# Patient Record
Sex: FEMALE | URBAN - METROPOLITAN AREA
[De-identification: names, ages, dates, MRNs, and addresses within clinical notes are randomized per-mention and may not be internally consistent; named-entity substitution may affect disease eponyms.]

---

## 2017-04-10 ENCOUNTER — INPATIENT (INPATIENT)
Facility: HOSPITAL | Age: 82
LOS: 4 days | DRG: 682 | End: 2017-04-15
Attending: FAMILY MEDICINE | Admitting: HOSPITALIST
Payer: COMMERCIAL

## 2017-04-10 VITALS
SYSTOLIC BLOOD PRESSURE: 109 MMHG | DIASTOLIC BLOOD PRESSURE: 68 MMHG | HEART RATE: 88 BPM | RESPIRATION RATE: 14 BRPM | TEMPERATURE: 99 F | OXYGEN SATURATION: 96 %

## 2017-04-10 DIAGNOSIS — M10.9 GOUT, UNSPECIFIED: ICD-10-CM

## 2017-04-10 DIAGNOSIS — N28.9 DISORDER OF KIDNEY AND URETER, UNSPECIFIED: ICD-10-CM

## 2017-04-10 DIAGNOSIS — E87.1 HYPO-OSMOLALITY AND HYPONATREMIA: ICD-10-CM

## 2017-04-10 DIAGNOSIS — E86.0 DEHYDRATION: ICD-10-CM

## 2017-04-10 DIAGNOSIS — K21.9 GASTRO-ESOPHAGEAL REFLUX DISEASE WITHOUT ESOPHAGITIS: ICD-10-CM

## 2017-04-10 DIAGNOSIS — E78.5 HYPERLIPIDEMIA, UNSPECIFIED: ICD-10-CM

## 2017-04-10 DIAGNOSIS — K65.4 SCLEROSING MESENTERITIS: ICD-10-CM

## 2017-04-10 DIAGNOSIS — R13.10 DYSPHAGIA, UNSPECIFIED: ICD-10-CM

## 2017-04-10 DIAGNOSIS — R50.9 FEVER, UNSPECIFIED: ICD-10-CM

## 2017-04-10 DIAGNOSIS — I82.90 ACUTE EMBOLISM AND THROMBOSIS OF UNSPECIFIED VEIN: ICD-10-CM

## 2017-04-10 DIAGNOSIS — J98.11 ATELECTASIS: ICD-10-CM

## 2017-04-10 DIAGNOSIS — J44.9 CHRONIC OBSTRUCTIVE PULMONARY DISEASE, UNSPECIFIED: ICD-10-CM

## 2017-04-10 DIAGNOSIS — I10 ESSENTIAL (PRIMARY) HYPERTENSION: ICD-10-CM

## 2017-04-10 DIAGNOSIS — N17.9 ACUTE KIDNEY FAILURE, UNSPECIFIED: ICD-10-CM

## 2017-04-10 LAB
ALBUMIN SERPL ELPH-MCNC: 1 G/DL — LOW (ref 3.3–5)
ALP SERPL-CCNC: 71 U/L — SIGNIFICANT CHANGE UP (ref 40–120)
ALT FLD-CCNC: 19 U/L — SIGNIFICANT CHANGE UP (ref 12–78)
AMYLASE P1 CFR SERPL: 17 U/L — LOW (ref 25–115)
ANION GAP SERPL CALC-SCNC: 12 MMOL/L — SIGNIFICANT CHANGE UP (ref 5–17)
APPEARANCE UR: CLEAR — SIGNIFICANT CHANGE UP
AST SERPL-CCNC: 37 U/L — SIGNIFICANT CHANGE UP (ref 15–37)
BASOPHILS NFR BLD AUTO: 3 % — HIGH (ref 0–2)
BILIRUB SERPL-MCNC: 0.5 MG/DL — SIGNIFICANT CHANGE UP (ref 0.2–1.2)
BILIRUB UR-MCNC: ABNORMAL
BUN SERPL-MCNC: 47 MG/DL — HIGH (ref 7–23)
CALCIUM SERPL-MCNC: 8.1 MG/DL — LOW (ref 8.5–10.1)
CHLORIDE SERPL-SCNC: 107 MMOL/L — SIGNIFICANT CHANGE UP (ref 96–108)
CO2 SERPL-SCNC: 12 MMOL/L — LOW (ref 22–31)
COLOR SPEC: YELLOW — SIGNIFICANT CHANGE UP
CREAT SERPL-MCNC: 3.1 MG/DL — HIGH (ref 0.5–1.3)
DIFF PNL FLD: ABNORMAL
GLUCOSE SERPL-MCNC: 62 MG/DL — LOW (ref 70–99)
GLUCOSE UR QL: NEGATIVE — SIGNIFICANT CHANGE UP
HCT VFR BLD CALC: 40.3 % — SIGNIFICANT CHANGE UP (ref 34.5–45)
HGB BLD-MCNC: 12.8 G/DL — SIGNIFICANT CHANGE UP (ref 11.5–15.5)
KETONES UR-MCNC: ABNORMAL
LACTATE SERPL-SCNC: 1.5 MMOL/L — SIGNIFICANT CHANGE UP (ref 0.7–2)
LACTATE SERPL-SCNC: 2.2 MMOL/L — HIGH (ref 0.7–2)
LEUKOCYTE ESTERASE UR-ACNC: ABNORMAL
LIDOCAIN IGE QN: 72 U/L — LOW (ref 73–393)
LYMPHOCYTES # BLD AUTO: 14 % — SIGNIFICANT CHANGE UP (ref 13–44)
MCHC RBC-ENTMCNC: 30.1 PG — SIGNIFICANT CHANGE UP (ref 27–34)
MCHC RBC-ENTMCNC: 31.7 GM/DL — LOW (ref 32–36)
MCV RBC AUTO: 94.9 FL — SIGNIFICANT CHANGE UP (ref 80–100)
MONOCYTES NFR BLD AUTO: 6 % — SIGNIFICANT CHANGE UP (ref 1–9)
NEUTROPHILS NFR BLD AUTO: 70 % — SIGNIFICANT CHANGE UP (ref 43–77)
NITRITE UR-MCNC: NEGATIVE — SIGNIFICANT CHANGE UP
PH UR: 5 — SIGNIFICANT CHANGE UP (ref 4.8–8)
PLATELET # BLD AUTO: 116 K/UL — LOW (ref 150–400)
POTASSIUM SERPL-MCNC: 4.8 MMOL/L — SIGNIFICANT CHANGE UP (ref 3.5–5.3)
POTASSIUM SERPL-SCNC: 4.8 MMOL/L — SIGNIFICANT CHANGE UP (ref 3.5–5.3)
PROT SERPL-MCNC: 8.2 G/DL — SIGNIFICANT CHANGE UP (ref 6–8.3)
PROT UR-MCNC: 75 MG/DL
RBC # BLD: 4.25 M/UL — SIGNIFICANT CHANGE UP (ref 3.8–5.2)
RBC # FLD: 13.7 % — SIGNIFICANT CHANGE UP (ref 10.3–14.5)
SODIUM SERPL-SCNC: 131 MMOL/L — LOW (ref 135–145)
SP GR SPEC: 1.02 — SIGNIFICANT CHANGE UP (ref 1.01–1.02)
UROBILINOGEN FLD QL: 1
WBC # BLD: 7.1 K/UL — SIGNIFICANT CHANGE UP (ref 3.8–10.5)
WBC # FLD AUTO: 7.1 K/UL — SIGNIFICANT CHANGE UP (ref 3.8–10.5)

## 2017-04-10 PROCEDURE — 76775 US EXAM ABDO BACK WALL LIM: CPT | Mod: 26

## 2017-04-10 PROCEDURE — 73502 X-RAY EXAM HIP UNI 2-3 VIEWS: CPT | Mod: 26,RT

## 2017-04-10 PROCEDURE — 74176 CT ABD & PELVIS W/O CONTRAST: CPT | Mod: 26

## 2017-04-10 PROCEDURE — 93010 ELECTROCARDIOGRAM REPORT: CPT

## 2017-04-10 PROCEDURE — 99223 1ST HOSP IP/OBS HIGH 75: CPT | Mod: AI,GC

## 2017-04-10 PROCEDURE — 71010: CPT | Mod: 26

## 2017-04-10 PROCEDURE — 99285 EMERGENCY DEPT VISIT HI MDM: CPT

## 2017-04-10 RX ORDER — DOCUSATE SODIUM 100 MG
100 CAPSULE ORAL
Refills: 0 | Status: DISCONTINUED | OUTPATIENT
Start: 2017-04-10 | End: 2017-04-15

## 2017-04-10 RX ORDER — SODIUM CHLORIDE 9 MG/ML
1000 INJECTION INTRAMUSCULAR; INTRAVENOUS; SUBCUTANEOUS
Refills: 0 | Status: DISCONTINUED | OUTPATIENT
Start: 2017-04-10 | End: 2017-04-12

## 2017-04-10 RX ORDER — SODIUM CHLORIDE 0.65 %
1 AEROSOL, SPRAY (ML) NASAL ONCE
Refills: 0 | Status: COMPLETED | OUTPATIENT
Start: 2017-04-10 | End: 2017-04-15

## 2017-04-10 RX ORDER — IPRATROPIUM/ALBUTEROL SULFATE 18-103MCG
3 AEROSOL WITH ADAPTER (GRAM) INHALATION EVERY 6 HOURS
Refills: 0 | Status: DISCONTINUED | OUTPATIENT
Start: 2017-04-10 | End: 2017-04-15

## 2017-04-10 RX ORDER — SENNA PLUS 8.6 MG/1
2 TABLET ORAL AT BEDTIME
Refills: 0 | Status: DISCONTINUED | OUTPATIENT
Start: 2017-04-10 | End: 2017-04-15

## 2017-04-10 RX ORDER — PANTOPRAZOLE SODIUM 20 MG/1
40 TABLET, DELAYED RELEASE ORAL ONCE
Refills: 0 | Status: COMPLETED | OUTPATIENT
Start: 2017-04-10 | End: 2017-04-10

## 2017-04-10 RX ORDER — HEPARIN SODIUM 5000 [USP'U]/ML
5000 INJECTION INTRAVENOUS; SUBCUTANEOUS EVERY 12 HOURS
Refills: 0 | Status: DISCONTINUED | OUTPATIENT
Start: 2017-04-10 | End: 2017-04-15

## 2017-04-10 RX ORDER — ALLOPURINOL 300 MG
100 TABLET ORAL DAILY
Refills: 0 | Status: DISCONTINUED | OUTPATIENT
Start: 2017-04-10 | End: 2017-04-15

## 2017-04-10 RX ORDER — SODIUM CHLORIDE 9 MG/ML
1000 INJECTION, SOLUTION INTRAVENOUS
Refills: 0 | Status: COMPLETED | OUTPATIENT
Start: 2017-04-10 | End: 2017-04-10

## 2017-04-10 RX ORDER — ALLOPURINOL 300 MG
2.5 TABLET ORAL DAILY
Refills: 0 | Status: DISCONTINUED | OUTPATIENT
Start: 2017-04-10 | End: 2017-04-10

## 2017-04-10 RX ORDER — SODIUM CHLORIDE 9 MG/ML
1000 INJECTION INTRAMUSCULAR; INTRAVENOUS; SUBCUTANEOUS ONCE
Refills: 0 | Status: COMPLETED | OUTPATIENT
Start: 2017-04-10 | End: 2017-04-10

## 2017-04-10 RX ORDER — AMLODIPINE BESYLATE 2.5 MG/1
5 TABLET ORAL DAILY
Refills: 0 | Status: DISCONTINUED | OUTPATIENT
Start: 2017-04-10 | End: 2017-04-13

## 2017-04-10 RX ORDER — ATORVASTATIN CALCIUM 80 MG/1
10 TABLET, FILM COATED ORAL AT BEDTIME
Refills: 0 | Status: DISCONTINUED | OUTPATIENT
Start: 2017-04-10 | End: 2017-04-15

## 2017-04-10 RX ORDER — SODIUM CHLORIDE 9 MG/ML
3 INJECTION INTRAMUSCULAR; INTRAVENOUS; SUBCUTANEOUS ONCE
Refills: 0 | Status: COMPLETED | OUTPATIENT
Start: 2017-04-10 | End: 2017-04-10

## 2017-04-10 RX ORDER — PANTOPRAZOLE SODIUM 20 MG/1
40 TABLET, DELAYED RELEASE ORAL
Refills: 0 | Status: DISCONTINUED | OUTPATIENT
Start: 2017-04-10 | End: 2017-04-15

## 2017-04-10 RX ADMIN — SODIUM CHLORIDE 1000 MILLILITER(S): 9 INJECTION INTRAMUSCULAR; INTRAVENOUS; SUBCUTANEOUS at 10:45

## 2017-04-10 RX ADMIN — ATORVASTATIN CALCIUM 10 MILLIGRAM(S): 80 TABLET, FILM COATED ORAL at 22:04

## 2017-04-10 RX ADMIN — SODIUM CHLORIDE 70 MILLILITER(S): 9 INJECTION INTRAMUSCULAR; INTRAVENOUS; SUBCUTANEOUS at 13:08

## 2017-04-10 RX ADMIN — SODIUM CHLORIDE 1000 MILLILITER(S): 9 INJECTION INTRAMUSCULAR; INTRAVENOUS; SUBCUTANEOUS at 08:22

## 2017-04-10 RX ADMIN — SENNA PLUS 2 TABLET(S): 8.6 TABLET ORAL at 19:39

## 2017-04-10 RX ADMIN — SODIUM CHLORIDE 3 MILLILITER(S): 9 INJECTION INTRAMUSCULAR; INTRAVENOUS; SUBCUTANEOUS at 08:21

## 2017-04-10 RX ADMIN — SODIUM CHLORIDE 75 MILLILITER(S): 9 INJECTION, SOLUTION INTRAVENOUS at 13:11

## 2017-04-10 RX ADMIN — PANTOPRAZOLE SODIUM 40 MILLIGRAM(S): 20 TABLET, DELAYED RELEASE ORAL at 08:36

## 2017-04-10 RX ADMIN — Medication 100 MILLIGRAM(S): at 19:39

## 2017-04-10 NOTE — PATIENT PROFILE ADULT. - PMH
Acute kidney injury    COPD (chronic obstructive pulmonary disease)    Dysphagia    GERD (gastroesophageal reflux disease)    HTN (hypertension)    Hyperlipidemia

## 2017-04-10 NOTE — H&P ADULT - PROBLEM SELECTOR PLAN 1
likely 2/2 dehydration; symptomatically improved with IVF  1. will cont ivf 70cc/hr  2. hyponatremia likely 2/2 dehydration, will monitor bmp likely 2/2 dehydration; symptomatically improved with IVF  1. will cont ivf 70cc/hr  2. hyponatremia likely 2/2 dehydration, will monitor bmp  3. consulting dr pope for nephrology consult

## 2017-04-10 NOTE — H&P ADULT - NSHPLABSRESULTS_GEN_ALL_CORE
BMP: Na =131, BUN/Cr = 47/3.1.    CT Renal Stone Hunt: Impression:  No hydroureteronephrosis or urolithiasis.  Bilateral indeterminate renal cortical lesions as described. Correlate   with MR  Nonspecific mesenteric panniculitis.  Bibasilar pulmonary parenchymal nodules. Mild mediastinal adenopathy.   Correlate with chest CT.    Xray Chest view AP: IMPRESSION:   Bilateral lower lobe atelectasis or infiltrate.    Xray Hip w/ Pelvis: IMPRESSION:   Severe right hip osteoarthritis. BMP: Na =131, BUN/Cr = 47/3.1.    CT Renal Stone Hunt: Impression:  No hydroureteronephrosis or urolithiasis.  Bilateral indeterminate renal cortical lesions as described. Correlate   with MR  Nonspecific mesenteric panniculitis.  Bibasilar pulmonary parenchymal nodules. Mild mediastinal adenopathy.   Correlate with chest CT.    Xray Chest view AP: IMPRESSION:   Bilateral lower lobe atelectasis or infiltrate.    Xray Hip w/ Pelvis: IMPRESSION:   Severe right hip osteoarthritis.    EKG: PAC, left axis deviation, inferior infarct age undetermined, @76bpm.

## 2017-04-10 NOTE — H&P ADULT - PMH
Acute kidney injury    COPD (chronic obstructive pulmonary disease)    Dysphagia    GERD (gastroesophageal reflux disease)    HTN (hypertension)    Hyperlipidemia Acute kidney injury    COPD (chronic obstructive pulmonary disease)    Dysphagia    GERD (gastroesophageal reflux disease)    Gout    HTN (hypertension)    Hyperlipidemia

## 2017-04-10 NOTE — H&P ADULT - PROBLEM SELECTOR PLAN 8
will start on heparin for dvt ppx controlled  1. will continue amlodopine. will hold furosemide and valsartan for now

## 2017-04-10 NOTE — H&P ADULT - PROBLEM SELECTOR PLAN 4
chronic, spo2 = 98  1. will start O2 2LPM  2. Albuterol neb prn febrile in ER  1. f/u cbc and cultures

## 2017-04-10 NOTE — H&P ADULT - PROBLEM SELECTOR PLAN 7
controlled  1. will continue amlodopine. will hold furosemide and valsartan for now asymptomatic  1. will continue omeprazole

## 2017-04-10 NOTE — ED PROVIDER NOTE - MEDICAL DECISION MAKING DETAILS
cbc, cmp, amylase, lipase, lactate, UA, urine culture, EKG, chest x-ray, saline lock, IVF, right hip x-ray

## 2017-04-10 NOTE — ED PROVIDER NOTE - CARE PLAN
Principal Discharge DX:	Renal insufficiency  Instructions for follow-up, activity and diet:	admit  Secondary Diagnosis:	Dehydration  Secondary Diagnosis:	Weakness

## 2017-04-10 NOTE — H&P ADULT - ASSESSMENT
89F pmhx hld, gerd, htn, recently discharge with acute kidney injury r/o renal malignancy. 89F pmhx hld, gerd, htn, recent kidney injury admitted for acute kidney failure r/o renal malignancy. 89F pmhx hld, gerd, htn, recent kidney injury admitted for acute kidney failure secondary to dehydration , renal lesions 89F pmhx hld, gerd, and htn admitted for acute kidney failure secondary 2/2 dehydration, r/o malignancy from renal lesions

## 2017-04-10 NOTE — CHART NOTE - NSCHARTNOTEFT_GEN_A_CORE
PGY1 Service Note    Was called by RN because patient's family was present at bedside with questions, and because patient was constipated. Patient's family reports she has had very little urine output all day, and no BM in 2 days.     Vital Signs Last 24 Hrs  T(C): 36.7, Max: 36.9 (04-10 @ 17:21)  T(F): 98.1, Max: 98.5 (04-10 @ 17:21)  HR: 80 (67 - 80)  BP: 113/71 (113/71 - 138/62)  BP(mean): --  RR: 20 (15 - 20)  SpO2: 96% (96% - 98%)  Wt(kg): --    General: Well developed, well nourished, NAD  HEENT: NCAT, PERRLA, EOMI bl, moist mucous membranes   Neck: Supple, nontender, no mass  Neurology: A&Ox3, nonfocal, CN II-XII grossly intact, sensation intact, no gait abnormalities   Respiratory: CTA B/L, No W/R/R  CV: RRR, +S1/S2, no murmurs, rubs or gallops  Abdominal: Soft, NT, ND +BSx4  Extremities: No C/C/E, + peripheral pulses  MSK: Normal ROM, no joint erythema or warmth, no joint swelling   Skin: warm, dry, normal color, no rash or abnormal lesions    Plan: Gave senna and colace, patient had BM and passed little urine in commode. Asked nurse to straight cath patient to help with urine output. Also patient was reporting mild nasal congestion, ordered ocean nasal spray and asked RN to give humidified air via nasal cannula. Patient much more comfortable after BM passed. Will reassess as needed. Plan discussed with senior. PGY1 Service Note    Was called by RN because patient's family was present at bedside with questions, and because patient was constipated. Patient's family reports she has had very little urine output all day, and no BM in 2 days.     Vital Signs Last 24 Hrs  T(C): 36.7, Max: 36.9 (04-10 @ 17:21)  T(F): 98.1, Max: 98.5 (04-10 @ 17:21)  HR: 80 (67 - 80)  BP: 113/71 (113/71 - 138/62)  BP(mean): --  RR: 20 (15 - 20)  SpO2: 96% (96% - 98%)  Wt(kg): --    General: Well developed, NAD  HEENT: NCAT, PERRLA, EOMI bl, moist mucous membranes   Neck: Supple  Neurology: A&Ox3, CN II-XII grossly intact.   Respiratory: CTA B/L  CV: RRR, +S1/S2, holosystolic murmur  Abdominal: Soft, NT, mildly distended, +BS in RLQ  Extremities: No C/C/E, + peripheral pulses      Plan: Gave senna and colace, patient had BM and passed little urine in commode. Asked nurse to straight cath patient to help with urine output. Also patient was reporting mild nasal congestion, ordered ocean nasal spray and asked RN to give humidified air via nasal cannula. Patient much more comfortable after BM passed. Will reassess as needed. Plan discussed with senior. PGY1 Service Note    Was called by RN because patient's family was present at bedside with questions, and because patient was constipated. Patient's family reports she has had very little urine output all day, and no BM in 2 days.     Vital Signs Last 24 Hrs  T(C): 36.7, Max: 36.9 (04-10 @ 17:21)  T(F): 98.1, Max: 98.5 (04-10 @ 17:21)  HR: 80 (67 - 80)  BP: 113/71 (113/71 - 138/62)  BP(mean): --  RR: 20 (15 - 20)  SpO2: 96% (96% - 98%)  Wt(kg): --    General: Well developed, NAD  HEENT: NCAT, PERRLA, EOMI bl, moist mucous membranes   Neck: Supple  Neurology: A&Ox3, CN II-XII grossly intact.   Respiratory: CTA B/L  CV: RRR, +S1/S2, holosystolic murmur  Abdominal: Soft, NT, mildly distended, +BS in RLQ  Extremities: No C/C/E, + peripheral pulses      Plan: Gave senna and colace, patient had BM and passed little urine in commode. Asked nurse to straight cath patient to help with urine output. Also patient was reporting mild nasal congestion, ordered ocean nasal spray and asked RN to give humidified air via nasal cannula. Patient much more comfortable after BM passed. Will reassess as needed. Plan discussed with senior resident.    21:51, Addendum: Pt put out 250 cc's of yellow urine via straight catheter.

## 2017-04-10 NOTE — GOALS OF CARE CONVERSATION - PERSONAL ADVANCE DIRECTIVE - CONVERSATION DETAILS
spoke to pts daughter  regarding pts condition .She states that she left [facility] 2 days ago where she was told she may have lymphoma. she states that pt is deteriorating and hardly eating. she requests  home hospice evaluation.

## 2017-04-10 NOTE — ED ADULT NURSE NOTE - OBJECTIVE STATEMENT
pts daughter states pt has stopped walking in the past few days and has been unable to eat or drink, pt states "she feels a lump ini her throat", pt was at [facility] a few days ago. pt daughter also states she is weak

## 2017-04-10 NOTE — ED PROVIDER NOTE - OBJECTIVE STATEMENT
Pt is a 89 yo female who presents to the ED with a cc of weakness.  Family reports that on Thursday pt experienced a 24 hour period of nausea and diarrhea.  These symptoms improved but then on Friday pt began to complain of a burning pain in her throat and feels like it is swollen.  She has not wanted to eat of drink since.  Family reports that the very little they get her to take passes without difficulty.  Pt daughter took her to [facility] several days ago where they did some blood work and a CT of her neck which relieved cervical lymph nodes bilaterally some greater then 1 cm suspicious for lymphoma vs inflammatory.  Pt has no history of lymphoma.  She was then discharged from the ED.  Daughter reports that she still is refusing to eat or drink and approx 4 days ago stop ambulating.  She used to get around the house with some help and the aid of a walker.  Pt lives at home with her daughter.  Daughter also reports that it seems like the pt is having anxiety attacks and it is concerning her.  Pt refusing to try to ambulate secondary to diffuse pain and weakness.  She is on chronic oxygen at 2 liters NC.

## 2017-04-10 NOTE — H&P ADULT - PROBLEM SELECTOR PLAN 2
unknown, will r/o malignancy  1. will consult hematology for plan  2. will consult palliative care for advance directives questionable if lymphoma as per [facility] assessment, will r/o malignancy  1. will consult hematology (dr vides/grace) for plan  2. will consult palliative care for advance directives

## 2017-04-10 NOTE — H&P ADULT - NSHPPHYSICALEXAM_GEN_ALL_CORE
GEN: fatigued but no acute pain or distress; Neuro: AAOx3, CN2-12 grossly intact,; CVS: holosystolic, S1S2; LUNG: CTA b/l, ABD: distended, NT, BS; Ext: noncyonotic, nonedematous, no lesions,

## 2017-04-10 NOTE — H&P ADULT - PROBLEM SELECTOR PROBLEM 6
Prophylactic use of unfractionated heparin for venous thromboembolism (VTE) Kidney lesion GERD (gastroesophageal reflux disease) HLD (hyperlipidemia)

## 2017-04-10 NOTE — ED PROVIDER NOTE - NEUROLOGICAL, MLM
Awake and alert to person and place not year which appears to be baseline.  Pt with generalized weakness to UE and LE.  Able to lift UE against gravity.  Unable to lift LE against gravity but able to hold in air once assisted off the bed

## 2017-04-10 NOTE — H&P ADULT - HISTORY OF PRESENT ILLNESS
Disorientation 3 days ago, patient complaining as she could not breath.  Taken to [facility], ct head and throat resulted with suspicion of lymphoma recommending hospice.  After discharge, patient was exhausted and disorientation persisted 2 days ago. 1 day ago she stopped talking and too weak to use bathroom.  Patient normally walks and eats with aid help.  Today when it was clear that symptoms had not improved, daughter decided to bring patient to emergency room.  Patient normally alert, however, currently confused.  Family only admits start of tramadol recently but otherwise takes medications as prescribed.  Medication is for chronic shoulder and hip pain.  Patient sleeping and eating less.  With translation in  Amharic, Patient only states tired and fatigue, denies cp, sob, n/v/d in last two days. Disorientation 3 days ago, patient complaining as she could not breath.  Taken to [facility], ct head and throat resulted with suspicion of lymphoma recommending hospice.  After discharge, patient was exhausted and disorientation persisted 2 days ago. 1 day ago she stopped talking and too weak to use bathroom.  Patient normally walks and eats with aid help.  Today when it was clear that symptoms had not improved, daughter decided to bring patient to emergency room.  Patient normally alert, however, currently confused.  Family only admits start of tramadol recently but otherwise takes medications as prescribed.  Medication is for chronic shoulder and hip pain.  Patient sleeping and eating less.  With translation in  Portuguese, Patient only states tired and fatigue, denies cp, sob, n/v/d in last two days.    Daughter denies any history of heart failure or kidney disease.  BUN/Cr on 4/7/17 = 39 / 1.82. 89F pmhx hld, htn, gerd, recent discharge from [facility] for dysphagia and lymphoma, c/o Disorientation 3 days ago, patient complaining as she could not breath.  Taken to [facility], ct head and throat resulted with suspicion of lymphoma recommending hospice.  After discharge, patient was exhausted and disorientation persisted 2 days ago. 1 day ago she stopped talking and too weak to use bathroom.  Patient normally walks and eats with aid help.  Today when it was clear that symptoms had not improved, daughter decided to bring patient to emergency room.  Patient normally alert, however, currently confused.  Family only admits start of tramadol recently but otherwise takes medications as prescribed.  Medication is for chronic shoulder and hip pain.  Patient sleeping and eating less.  With translation in  Kittitian, Patient only states tired and fatigue, denies cp, sob, n/v/d in last two days.    Daughter denies any history of heart failure or kidney disease.  BUN/Cr on 4/7/17 = 39 / 1.82. 89F pmhx hld, htn, gerd, recent discharge from [facility] for dysphagia and lymphoma, c/o Disorientation 3 days ago, patient complaining as she could not breath.  Taken to [facility], ct head and throat resulted with suspicion of lymphoma recommending hospice.  After discharge, patient was exhausted and disorientation persisted 2 days ago. 1 day ago she stopped talking and too weak to use bathroom.  Patient normally walks and eats with aid help.  Today when it was clear that symptoms had not improved, daughter decided to bring patient to emergency room.  Patient normally alert, however, currently confused.  Family only admits start of tramadol recently but otherwise takes medications as prescribed.  Medication is for chronic shoulder and hip pain.  Patient sleeping and eating less.  With translation in  Uzbek, Patient only states tired and fatigue, denies cp, sob, n/v/d in last two days.    Daughter denies any history of heart failure or kidney disease.  BUN/Cr (4/7/17 [facility])= 39 / 1.82.

## 2017-04-10 NOTE — H&P ADULT - PROBLEM SELECTOR PROBLEM 7
Prophylactic use of unfractionated heparin for venous thromboembolism (VTE) HTN (hypertension) GERD (gastroesophageal reflux disease)

## 2017-04-11 DIAGNOSIS — E83.42 HYPOMAGNESEMIA: ICD-10-CM

## 2017-04-11 LAB
ANION GAP SERPL CALC-SCNC: 10 MMOL/L — SIGNIFICANT CHANGE UP (ref 5–17)
BUN SERPL-MCNC: 45 MG/DL — HIGH (ref 7–23)
CALCIUM SERPL-MCNC: 7.6 MG/DL — LOW (ref 8.5–10.1)
CHLORIDE SERPL-SCNC: 113 MMOL/L — HIGH (ref 96–108)
CO2 SERPL-SCNC: 18 MMOL/L — LOW (ref 22–31)
CREAT SERPL-MCNC: 2.7 MG/DL — HIGH (ref 0.5–1.3)
CULTURE RESULTS: NO GROWTH — SIGNIFICANT CHANGE UP
EOSINOPHIL NFR BLD AUTO: 2 % — SIGNIFICANT CHANGE UP (ref 0–6)
GLUCOSE SERPL-MCNC: 64 MG/DL — LOW (ref 70–99)
HCT VFR BLD CALC: 35.3 % — SIGNIFICANT CHANGE UP (ref 34.5–45)
HGB BLD-MCNC: 11.1 G/DL — LOW (ref 11.5–15.5)
LYMPHOCYTES # BLD AUTO: 19 % — SIGNIFICANT CHANGE UP (ref 13–44)
MAGNESIUM SERPL-MCNC: 1.6 MG/DL — LOW (ref 1.8–2.4)
MCHC RBC-ENTMCNC: 29.6 PG — SIGNIFICANT CHANGE UP (ref 27–34)
MCHC RBC-ENTMCNC: 31.4 GM/DL — LOW (ref 32–36)
MCV RBC AUTO: 94.3 FL — SIGNIFICANT CHANGE UP (ref 80–100)
MONOCYTES NFR BLD AUTO: 7 % — SIGNIFICANT CHANGE UP (ref 1–9)
NEUTROPHILS NFR BLD AUTO: 66 % — SIGNIFICANT CHANGE UP (ref 43–77)
PHOSPHATE SERPL-MCNC: 5.2 MG/DL — HIGH (ref 2.5–4.5)
PLATELET # BLD AUTO: 90 K/UL — LOW (ref 150–400)
POTASSIUM SERPL-MCNC: 4.5 MMOL/L — SIGNIFICANT CHANGE UP (ref 3.5–5.3)
POTASSIUM SERPL-SCNC: 4.5 MMOL/L — SIGNIFICANT CHANGE UP (ref 3.5–5.3)
RBC # BLD: 3.74 M/UL — LOW (ref 3.8–5.2)
RBC # FLD: 13.9 % — SIGNIFICANT CHANGE UP (ref 10.3–14.5)
SODIUM SERPL-SCNC: 141 MMOL/L — SIGNIFICANT CHANGE UP (ref 135–145)
SPECIMEN SOURCE: SIGNIFICANT CHANGE UP
WBC # BLD: 6.3 K/UL — SIGNIFICANT CHANGE UP (ref 3.8–10.5)
WBC # FLD AUTO: 6.3 K/UL — SIGNIFICANT CHANGE UP (ref 3.8–10.5)

## 2017-04-11 PROCEDURE — 99233 SBSQ HOSP IP/OBS HIGH 50: CPT | Mod: GC

## 2017-04-11 RX ORDER — MAGNESIUM SULFATE 500 MG/ML
1 VIAL (ML) INJECTION ONCE
Refills: 0 | Status: COMPLETED | OUTPATIENT
Start: 2017-04-11 | End: 2017-04-11

## 2017-04-11 RX ADMIN — Medication 100 MILLIGRAM(S): at 12:04

## 2017-04-11 RX ADMIN — Medication 100 GRAM(S): at 12:04

## 2017-04-11 RX ADMIN — HEPARIN SODIUM 5000 UNIT(S): 5000 INJECTION INTRAVENOUS; SUBCUTANEOUS at 18:14

## 2017-04-11 RX ADMIN — Medication 100 MILLIGRAM(S): at 06:38

## 2017-04-11 RX ADMIN — PANTOPRAZOLE SODIUM 40 MILLIGRAM(S): 20 TABLET, DELAYED RELEASE ORAL at 06:38

## 2017-04-11 RX ADMIN — Medication 100 MILLIGRAM(S): at 18:14

## 2017-04-11 RX ADMIN — SODIUM CHLORIDE 70 MILLILITER(S): 9 INJECTION INTRAMUSCULAR; INTRAVENOUS; SUBCUTANEOUS at 21:03

## 2017-04-11 RX ADMIN — SENNA PLUS 2 TABLET(S): 8.6 TABLET ORAL at 21:05

## 2017-04-11 RX ADMIN — Medication 5 MILLIGRAM(S): at 06:38

## 2017-04-11 RX ADMIN — ATORVASTATIN CALCIUM 10 MILLIGRAM(S): 80 TABLET, FILM COATED ORAL at 21:05

## 2017-04-11 RX ADMIN — HEPARIN SODIUM 5000 UNIT(S): 5000 INJECTION INTRAVENOUS; SUBCUTANEOUS at 06:38

## 2017-04-11 NOTE — SWALLOW BEDSIDE ASSESSMENT ADULT - COMMENTS
89F pmhx hld, htn, gerd, recent discharge from [facility] for dysphagia and lymphoma, c/o Disorientation 3 days ago, patient complaining as she could not breath.  Taken to [facility], ct head and throat resulted with suspicion of lymphoma  Pt awake, alert c family/daughter present bedside. Daughter states dentures are at home  Pt c decreased mastication of solids/soft solids c reduced a-p lingual transport c timely trigger of swallow, clear breath sounds pre/post deglutition

## 2017-04-11 NOTE — PROGRESS NOTE ADULT - ATTENDING COMMENTS
see / above examination and plan . fu electrolyte , fu blood cult , speech and swallow evalu / physical therapy.

## 2017-04-11 NOTE — PHYSICAL THERAPY INITIAL EVALUATION ADULT - PERTINENT HX OF CURRENT PROBLEM, REHAB EVAL
Pt admitted s/p fall with left hip/shld pain.  Head CT->neg.; All x-rays left femur, left shld, left hip are negative for fx.

## 2017-04-11 NOTE — PHYSICAL THERAPY INITIAL EVALUATION ADULT - MANUAL MUSCLE TESTING RESULTS, REHAB EVAL
bilateral UEs 2/5; bilateral hip/knee 2-/5/grossly assessed due to
bilateral UEs 3+/5; bilateral LEs 3+/5/grossly assessed due to

## 2017-04-11 NOTE — PHYSICAL THERAPY INITIAL EVALUATION ADULT - BED MOBILITY LIMITATIONS, REHAB EVAL
decreased ability to use legs for bridging/pushing
decreased ability to use arms for pushing/pulling/decreased ability to use legs for bridging/pushing

## 2017-04-11 NOTE — SWALLOW BEDSIDE ASSESSMENT ADULT - SWALLOW EVAL: RECOMMENDED FEEDING/EATING TECHNIQUES
alternate food with liquid/check mouth frequently for oral residue/pocketing/crush medication (when feasible)/hard swallow w/ each bite or sip/maintain upright posture during/after eating for 30 mins

## 2017-04-11 NOTE — CONSULT NOTE ADULT - SUBJECTIVE AND OBJECTIVE BOX
All records reviewed.  Pt seen w daughter and multiple family memebers in room, hx from daughter    HPI:  89 y/o w HTN, hyperlipidemia, severe arthritis, distant hx left breast ca age 70's, brought in by family w progressive malaise, weakness, anorexia, weight loss, dysphagia for one month.  Pt was bought to [facility] a few days ago w same c/o, w/u in ER (reviewed)-CT head no sig acute pathology, CT neck (17) sig jayson cervical lymphadenopathies some greater than 1cm. Told has lymphoma and recommended Hospice.  Pt now admitted to [facility] w progressive same symptoms, w/u sig worsened renal function, felt to be azotemia, improved today since hydration. CT abdomen/pelvics showed jayson lower lobes multi pulm nodules, prom pretracheal nodes, jayson renal cysts and indeterminate masses.      PMH:  COPD (chronic obstructive pulmonary disease)  Acute kidney injury  GERD (gastroesophageal reflux disease)  CKD (chronic kidney disease)  Hyperlipidemia  HTN (hypertension)  left breast ca post lumpectomy and Rt age 70's  jayson cataract surgeries  severe arthritis      Review of System:  dysphagia, no odonophagia. Anorexia/weight loss(amount unknown). No SOB/WILKS/CP. Severe arthritic pain nguyễn hips and jayson shoulders.     MEDICATIONS  (STANDING):  heparin  Injectable 5000Unit(s) SubCutaneous every 12 hours  sodium chloride 0.9%. 1000milliLiter(s) IV Continuous <Continuous>  predniSONE   Tablet 5milliGRAM(s) Oral daily  atorvastatin 10milliGRAM(s) Oral at bedtime  amLODIPine   Tablet 5milliGRAM(s) Oral daily  pantoprazole    Tablet 40milliGRAM(s) Oral before breakfast  allopurinol 100milliGRAM(s) Oral daily  senna 2Tablet(s) Oral at bedtime  docusate sodium 100milliGRAM(s) Oral two times a day  magnesium sulfate  IVPB 1Gram(s) IV Intermittent once    MEDICATIONS  (PRN):  ALBUTerol/ipratropium for Nebulization 3milliLiter(s) Nebulizer every 6 hours PRN Shortness of Breath and/or Wheezing  sodium chloride 0.65% Nasal 1Spray(s) Both Nostrils once PRN Nasal Congestion      Allergies    No Known Allergies    Intolerances        SOCIAL HISTORY:  never smoker, no alcohol    FAMILY HISTORY:  breast ca-daughter  liver ca-father, brother, both alcoholics  throat ca-brother  kidney ca-daughter  son-leukemia      Vital Signs Last 24 Hrs  T(C): 37.1, Max: 37.1 ( @ 05:49)  T(F): 98.7, Max: 98.7 ( @ 05:49)  HR: 87 (67 - 88)  BP: 110/50 (110/50 - 138/62)  BP(mean): --  RR: 18 (14 - 20)  SpO2: 92% (92% - 98%)    PHYSICAL EXAM:      Constitutional:well developed well nourished, in no acute distress  Eyes:sclera anicteric, pupils equal and reactive to light  ENMT:buccal mucosa moist, pharynx not injected  Neck:supple, trachea midline  Back:no cva tenderness to palpation/percussion  Respiratory:clear, no wheeze/rhonchi  Cardiovascular:regular rate and rhythm, S1 S2  Gastrointestinal:abdomen distended but soft, nontender, no organomegaly present, bowel sounds normal  Neurological:alert and oriented x3, Cranial Nerves II-XII grossly intact  Skin:no increased ecchymosis/petechiae/purpura  Lymph Nodes:multiple jayson cervical, supraclavicular and infraclavicular LADs up to 2cm left infraclavicular. 4cm left axillary LAD, 2cm right axillary LADs  Extremities:no cyanosis/clubbing/edema        LABS:                        11.1   6.3   )-----------( x        ( 2017 07:57 )             35.3   x  04-11    141  |  113<H>  |  45<H>  ----------------------------<  64<L>  4.5   |  18<L>  |  2.70<H>    Ca    7.6<L>      2017 07:57  Phos  5.2     -  Mg     1.6         TPro  8.2  /  Alb  1.0<L>  /  TBili  0.5  /  DBili  x   /  AST  37  /  ALT  19  /  AlkPhos  71  04-10      Urinalysis Basic - ( 10 Apr 2017 08:48 )    Color: Yellow / Appearance: Clear / S.020 / pH: x  Gluc: x / Ketone: Trace  / Bili: Small / Urobili: 1   Blood: x / Protein: 75 mg/dL / Nitrite: Negative   Leuk Esterase: Trace / RBC: 0-2 /HPF / WBC 0-2   Sq Epi: x / Non Sq Epi: Occasional / Bacteria: x        RADIOLOGY & ADDITIONAL STUDIES:    XAM:  CT RENAL STONE HUNT                            PROCEDURE DATE:  04/10/2017        INTERPRETATION:  History: Renal failure.    Noncontrast CT abdomen and pelvis.  Multiple nodules lung bases bilaterally. Subsegmental atelectasis right   middle lobe. Mild pretracheal adenopathy. Correlate with CT chest.  Nonspecific right breast calcification.  No radiodense gallstones or biliary dilatation. Unenhanced liver pancreas   not remarkable. Spleen slightly prominent.  No hydroureteronephrosis orurolithiasis. Multiple bilateral simple renal   cysts. Several bilateral indeterminate hyperattenuating renal cortical   lesions largest on the left measuring 1.1 cm. These may represent   hyperdense/hemorrhagic cysts and/or solid lesions. Correlate with MR.  Hazy density in the mid abdominal mesentery consistent with nonspecific   mesenteric panniculitis.  Atherosclerotic nonaneurysmal aorta. No suspicious retroperitoneal   mesenteric adenopathy by CT size criteria.  No bowel obstruction or acute inflammation. No extraluminal fluid or gas.  Uterus adnexa normal for age. Bladder not remarkable.  Calcified buttock granulomas. Diffuse anasarca.  Severe osteoarthritis right hip. Diffuse spinal degenerative change.   Grade 1 anterolisthesis L4 onL5.    Impression:  No hydroureteronephrosis or urolithiasis.  Bilateral indeterminate renal cortical lesions as described. Correlate   with MR  Nonspecific mesenteric panniculitis.  Bibasilar pulmonary parenchymal nodules. Mild mediastinal adenopathy.   Correlate with chest CT.      EXAM:  US KIDNEY(S)                            PROCEDURE DATE:  04/10/2017        INTERPRETATION:  History: Renal failure. Indeterminate renal lesions   noted on CT the same day.    Bilateral renal ultrasound.  Very limited study due to patient body habitus, and lack of cooperation  Right kidney 15.9 cm long dimension left 15.2 cm. Multiple bilateral   renal cysts of varying size are noted. No solid lesions are noted. In   view of limited nature of this study and the indeterminate lesions noted  on CT, MR suggested for further evaluation. No hydronephrosis or   shadowing calculi.    Impression: Multiple bilateral renal cysts.  The indeterminate lesions described on CT the same day not visualized on   this limited study. As such MRI is suggested              PATRICIA CHAMBERS M.D., ATTENDING RADIOLOGIST  This document has been electronically signed. Apr 10 2017 12:54PM
nephrology consultation: Fredy Jorgensen     ADRIANA STARKEY     HPI:    89F past history of HTN, GERD, CKD admitted with history of difficulty swallowing and decreased PO intake. As per the family she was at [facility] for similar findings adn discharged home after the CT scan of the abdomen and pelvis with out any contrast. She was told of a possible lymphoma and was advised to follow up with PMD. She has been on lasix and also ARB at home for HTN. As per the family for the last 2-3 days she has been more disoriented after taking the tramadol for pain. Due to suspicion of lymphoma she was discharged home and recommended hospice.  After discharge, patient was exhausted and unable to eat. Patient normally walks and eats with aid help.  Patient normally alert, however, currently confused. Daughter denies any history of heart failure or kidney disease.  BUN/Cr (17 [facility])= 39 / 1.82. About 8 months ago her creatinine was 1.45.      PAST MEDICAL & SURGICAL HISTORY:  Gout  Dysphagia  COPD (chronic obstructive pulmonary disease)  Acute kidney injury  GERD (gastroesophageal reflux disease)  CKD (chronic kidney disease)  Hyperlipidemia  HTN (hypertension)  No significant past surgical history      Allergies    No Known Allergies    Intolerances        FAMILY HISTORY:  No pertinent family history in first degree relatives      SOCIAL HISTORY:    REVIEW OF SYSTEMS:    Constitutional: No fever, weight loss or fatigue  Eyes: No eye pain, visual disturbances, or discharge  ENT:  No difficulty hearing, tinnitus, vertigo; No sinus or throat pain  Neck: No pain or stiffness  Breasts: No pain, masses or nipple discharge  Respiratory: No cough, wheezing, chills or hemoptysis  Cardiovascular: No chest pain, palpitations, shortness of breath, dizziness or leg swelling  Gastrointestinal: No abdominal or epigastric pain. No nausea, vomiting or hematemesis; No diarrhea or constipation. No melena or hematochezia.  Genitourinary: No dysuria, frequency, hematuria or incontinence  Rectal: No pain, hemorrhoids or incontinence  Neurological: No headaches, memory loss, loss of strength, numbness or tremors  Skin: No itching, burning, rashes or lesions   Lymph Nodes: No enlarged glands  Endocrine: No heat or cold intolerance; No hair loss  Musculoskeletal: No joint pain or swelling; No muscle, back or extremity pain  Psychiatric: No depression, anxiety, mood swings or difficulty sleeping  Heme/Lymph: No easy bruising or bleeding gums  Allergy and Immunologic: No hives or eczema    heparin  Injectable 5000Unit(s) SubCutaneous every 12 hours  sodium chloride 0.9%. 1000milliLiter(s) IV Continuous <Continuous>  ALBUTerol/ipratropium for Nebulization 3milliLiter(s) Nebulizer every 6 hours PRN  predniSONE   Tablet 5milliGRAM(s) Oral daily  atorvastatin 10milliGRAM(s) Oral at bedtime  amLODIPine   Tablet 5milliGRAM(s) Oral daily  pantoprazole    Tablet 40milliGRAM(s) Oral before breakfast  allopurinol 100milliGRAM(s) Oral daily  senna 2Tablet(s) Oral at bedtime  docusate sodium 100milliGRAM(s) Oral two times a day  sodium chloride 0.65% Nasal 1Spray(s) Both Nostrils once PRN      Vital Signs Last 24 Hrs  T(C): 36.9, Max: 38 (04-10 @ 08:37)  T(F): 98.5, Max: 100.4 (04-10 @ 08:37)  HR: 85 (67 - 97)  BP: 120/70 (97/65 - 138/62)  BP(mean): --  RR: 18 (14 - 20)  SpO2: 94% (94% - 98%)    PHYSICAL EXAM:    Constitutional: NAD, well-groomed, well-developed  HEENT: PERRLA, EOMI, Normal Hearing, MMM  Neck: No LAD, No JVD  Back: Normal spine flexure, No CVA tenderness  Respiratory: CTAB/L   Cardiovascular: S1 and S2, RRR, no M/G/R  Gastrointestinal: BS+, soft, NT/ND  Extremities: No peripheral edema  Vascular: 2+ peripheral pulses  Neurological: awake and alert.  Skin: No rashes      LABS:                        12.8   7.1   )-----------( 116      ( 10 Apr 2017 08:14 )             40.3     04-10    131<L>  |  107  |  47<H>  ----------------------------<  62<L>  4.8   |  12<L>  |  3.10<H>    Ca    8.1<L>      10 Apr 2017 08:14    TPro  8.2  /  Alb  1.0<L>  /  TBili  0.5  /  DBili  x   /  AST  37  /  ALT  19  /  AlkPhos  71  04-10      Urinalysis Basic - ( 10 Apr 2017 08:48 )    Color: Yellow / Appearance: Clear / S.020 / pH: x  Gluc: x / Ketone: Trace  / Bili: Small / Urobili: 1   Blood: x / Protein: 75 mg/dL / Nitrite: Negative   Leuk Esterase: Trace / RBC: 0-2 /HPF / WBC 0-2   Sq Epi: x / Non Sq Epi: Occasional / Bacteria: x        MICROBIOLOGY:  RECENT CULTURES:        RADIOLOGY & ADDITIONAL STUDIES:

## 2017-04-11 NOTE — PHYSICAL THERAPY INITIAL EVALUATION ADULT - RANGE OF MOTION EXAMINATION, REHAB EVAL
bilateral shld flex 1/2 range; bilateral hip flex, 1/4 range; bilateral knee ext 1/4 range/deficits as listed below
bilateral upper extremity ROM was WFL (within functional limits)/bilateral lower extremity ROM was WFL (within functional limits)

## 2017-04-11 NOTE — PHYSICAL THERAPY INITIAL EVALUATION ADULT - PLANNED THERAPY INTERVENTIONS, PT EVAL
bed mobility training/gait training/transfer training
bed mobility training/gait training/transfer training

## 2017-04-11 NOTE — PHYSICAL THERAPY INITIAL EVALUATION ADULT - TRANSFER TRAINING, PT EVAL
Goals: (3-5 days): Sit<->stand at walker with mod assist x 1
Goals: (3-5 days): Sit<->stand at walker with min assist x 1

## 2017-04-11 NOTE — CONSULT NOTE ADULT - ASSESSMENT
89 y/o woman w HTN, arthritis, hyperlipidemia, baseline renal insuff, distant hx left breast ca, adm w progressive physical deterioration, anorexia, weight loss, dysphagia, weakness over past month. workup sig for multiple lymphadenopathies,jayson renal indeterminate masses, jayson pulm nodules.  findings highly suspicious for met malignancy, ?lymphoma, ?solid tumor such as kidney and other sources.  prognosis poor.  discussed at length w daughter, who does not wish aggressive intervention, seeks comfort care, accepting home Hospice when offered.  at this time family would like to optimized pt clinical condition prior to discharge to rehab, then home Hospice.  agree w Speech and Swallow, Physical Therapy.  counseled, encouraged, questions answered    Will sign off for now, please call if any questions.  Thank you.
Assessment:       89y year old patient with a past medical history of HTN, Dementia, CAD, CHF, CKD admitted for MS changes and decreased oral intake and she is seen for CALLIE on CKD.   The CT scan showed multiple bilateral cystic disease. The most likely  reason for the  CALLIE is pre renal azotemia.     Plan:    will continue IVF at 75 cc an hour.  will hold the diuretics and ARB for now.  if need be we can give PRN doses of lasix.   patient is at a high risk for contrast studies.  may get an MRI with out mindy for evaluation of the lymphoma.    Discussed with patient and family at bedside  will continue to follow closely and give recommendations on daily basis as needed.

## 2017-04-11 NOTE — PROGRESS NOTE ADULT - ASSESSMENT
8F pmhx hld, htn, gerd, recent discharge from [facility] for dysphagia and lymphoma, c/o Disorientation 3 days ago, patient complaining as she could not breath.  Taken to [facility], ct head and throat resulted with suspicion of lymphoma recommending hospice. pt decrease po intake  / dehydrations  admitted kirk

## 2017-04-11 NOTE — PHYSICAL THERAPY INITIAL EVALUATION ADULT - GAIT TRAINING, PT EVAL
Goals: (3-5 days): Ambulate with rolling walker 10 feet with mod assist x 1
Goals: (3-5 days): Ambulate with rolling walker 50 feet with min assist x 1

## 2017-04-11 NOTE — PHYSICAL THERAPY INITIAL EVALUATION ADULT - ADDITIONAL COMMENTS
Lives in house with dtr.  6 stairs to enter with railing.  No stairs inside. Pt has w/c for long distances and was homebound as per dtr.
Lives in house with sister.  3 stairs to enter with bilateral railings; no stairs inside.  Pt. on home O2 @ 2L.

## 2017-04-11 NOTE — PROGRESS NOTE ADULT - ASSESSMENT
Assessment:       89y year old patient with a past medical history of seen for CALLIE. The most likley reason for the CALLIE is due to pre renal azotemia. She has a baseline creatinine of 1.8 about 3 weeks ago at [place] .     Plan:    continue gently hydration, change IVF to half NS at 60 cc an hour.  over all prognosis is poor. Continue comfort care. Suggested hospice to patient's daughter.    Discussed with patient and family at bedside    will continue to follow closely and give recommendations on daily basis as needed.

## 2017-04-11 NOTE — PHYSICAL THERAPY INITIAL EVALUATION ADULT - PERTINENT HX OF CURRENT PROBLEM, REHAB EVAL
Pt. admitted disorientation s/p recent d/c from [facility] for dysphagia and lymphoma.  Renal US->multiple bilateral renal cysts.  X-ray right hip->sever right hip OA.

## 2017-04-11 NOTE — PROGRESS NOTE ADULT - SUBJECTIVE AND OBJECTIVE BOX
ADRIANA STARKEY  89y  Female    Patient is a 89y female seen at bed side. still feels weak and tired. Denies any chest pain or shortness of breath.   As per the nursing staff, she was confused last night. appetite is very poor. Waiting for speech and swallow evaluation.        PAST MEDICAL & SURGICAL HISTORY:  Gout  Dysphagia  COPD (chronic obstructive pulmonary disease)  Acute kidney injury  GERD (gastroesophageal reflux disease)  CKD (chronic kidney disease)  Hyperlipidemia  HTN (hypertension)  No significant past surgical history      PHYSICAL EXAM:    T(C): 37.1, Max: 37.1 (04-11 @ 05:49)  HR: 87 (67 - 97)  BP: 110/50 (97/65 - 138/62)  RR: 18 (14 - 20)  SpO2: 92% (92% - 98%)  Wt(kg): --    I&O's Detail    I & Os for current day (as of 2017 10:44)  =============================================  IN:    sodium chloride 0.9%.: 840 ml    Total IN: 840 ml  ---------------------------------------------  OUT:    Voided: 250 ml    Stool: 1 ml    Total OUT: 251 ml  ---------------------------------------------  Total NET: 589 ml      Respiratory: clear anteriorly, decreased BS at bases  Cardiovascular: S1 S2  Gastrointestinal: soft NT ND +BS  Extremities: edema   Neuro: Awake and alert    MEDICATIONS  (STANDING):  heparin  Injectable 5000Unit(s) SubCutaneous every 12 hours  sodium chloride 0.9%. 1000milliLiter(s) IV Continuous <Continuous>  predniSONE   Tablet 5milliGRAM(s) Oral daily  atorvastatin 10milliGRAM(s) Oral at bedtime  amLODIPine   Tablet 5milliGRAM(s) Oral daily  pantoprazole    Tablet 40milliGRAM(s) Oral before breakfast  allopurinol 100milliGRAM(s) Oral daily  senna 2Tablet(s) Oral at bedtime  docusate sodium 100milliGRAM(s) Oral two times a day  magnesium sulfate  IVPB 1Gram(s) IV Intermittent once    MEDICATIONS  (PRN):  ALBUTerol/ipratropium for Nebulization 3milliLiter(s) Nebulizer every 6 hours PRN Shortness of Breath and/or Wheezing  sodium chloride 0.65% Nasal 1Spray(s) Both Nostrils once PRN Nasal Congestion                            11.1   6.3   )-----------( x        ( 2017 07:57 )             35.3       04-11    141  |  113<H>  |  45<H>  ----------------------------<  64<L>  4.5   |  18<L>  |  2.70<H>    Ca    7.6<L>      2017 07:57  Phos  5.2     04-11  Mg     1.6         TPro  8.2  /  Alb  1.0<L>  /  TBili  0.5  /  DBili  x   /  AST  37  /  ALT  19  /  AlkPhos  71  04-10      Urinalysis Basic - ( 10 Apr 2017 08:48 )    Color: Yellow / Appearance: Clear / S.020 / pH: x  Gluc: x / Ketone: Trace  / Bili: Small / Urobili: 1   Blood: x / Protein: 75 mg/dL / Nitrite: Negative   Leuk Esterase: Trace / RBC: 0-2 /HPF / WBC 0-2   Sq Epi: x / Non Sq Epi: Occasional / Bacteria: x      radiology:  EXAM:  US KIDNEY(S)                            PROCEDURE DATE:  04/10/2017        INTERPRETATION:  History: Renal failure. Indeterminate renal lesions   noted on CT the same day.    Bilateral renal ultrasound.  Very limited study due to patient body habitus, and lack of cooperation  Right kidney 15.9 cm long dimension left 15.2 cm. Multiple bilateral   renal cysts of varying size are noted. No solid lesions are noted. In   view of limited nature of this study and the indeterminate lesions noted  on CT, MR suggested for further evaluation. No hydronephrosis or   shadowing calculi.    Impression: Multiple bilateral renal cysts.  The indeterminate lesions described on CT the same day not visualized on   this limited study. As such MRI is suggested

## 2017-04-12 DIAGNOSIS — J18.9 PNEUMONIA, UNSPECIFIED ORGANISM: ICD-10-CM

## 2017-04-12 LAB
ANION GAP SERPL CALC-SCNC: 11 MMOL/L — SIGNIFICANT CHANGE UP (ref 5–17)
BUN SERPL-MCNC: 49 MG/DL — HIGH (ref 7–23)
CALCIUM SERPL-MCNC: 7.8 MG/DL — LOW (ref 8.5–10.1)
CHLORIDE SERPL-SCNC: 114 MMOL/L — HIGH (ref 96–108)
CO2 SERPL-SCNC: 18 MMOL/L — LOW (ref 22–31)
CREAT SERPL-MCNC: 2.8 MG/DL — HIGH (ref 0.5–1.3)
GLUCOSE SERPL-MCNC: 63 MG/DL — LOW (ref 70–99)
HCT VFR BLD CALC: 34.7 % — SIGNIFICANT CHANGE UP (ref 34.5–45)
HGB BLD-MCNC: 11.2 G/DL — LOW (ref 11.5–15.5)
LYMPHOCYTES # BLD AUTO: 8 % — LOW (ref 13–44)
MAGNESIUM SERPL-MCNC: 2 MG/DL — SIGNIFICANT CHANGE UP (ref 1.8–2.4)
MCHC RBC-ENTMCNC: 30.8 PG — SIGNIFICANT CHANGE UP (ref 27–34)
MCHC RBC-ENTMCNC: 32.4 GM/DL — SIGNIFICANT CHANGE UP (ref 32–36)
MCV RBC AUTO: 94.9 FL — SIGNIFICANT CHANGE UP (ref 80–100)
MONOCYTES NFR BLD AUTO: 4 % — SIGNIFICANT CHANGE UP (ref 1–9)
NEUTROPHILS NFR BLD AUTO: 72 % — SIGNIFICANT CHANGE UP (ref 43–77)
NEUTS BAND # BLD: 14 % — HIGH (ref 0–8)
PLAT MORPH BLD: NORMAL — SIGNIFICANT CHANGE UP
PLATELET # BLD AUTO: 90 K/UL — LOW (ref 150–400)
POTASSIUM SERPL-MCNC: 4.9 MMOL/L — SIGNIFICANT CHANGE UP (ref 3.5–5.3)
POTASSIUM SERPL-SCNC: 4.9 MMOL/L — SIGNIFICANT CHANGE UP (ref 3.5–5.3)
RBC # BLD: 3.65 M/UL — LOW (ref 3.8–5.2)
RBC # FLD: 14.2 % — SIGNIFICANT CHANGE UP (ref 10.3–14.5)
RBC BLD AUTO: SIGNIFICANT CHANGE UP
SODIUM SERPL-SCNC: 143 MMOL/L — SIGNIFICANT CHANGE UP (ref 135–145)
VARIANT LYMPHS # BLD: 2 % — SIGNIFICANT CHANGE UP (ref 0–6)
WBC # BLD: 8.4 K/UL — SIGNIFICANT CHANGE UP (ref 3.8–10.5)
WBC # FLD AUTO: 8.4 K/UL — SIGNIFICANT CHANGE UP (ref 3.8–10.5)

## 2017-04-12 PROCEDURE — 99233 SBSQ HOSP IP/OBS HIGH 50: CPT | Mod: GC

## 2017-04-12 RX ORDER — CEFTRIAXONE 500 MG/1
1 INJECTION, POWDER, FOR SOLUTION INTRAMUSCULAR; INTRAVENOUS ONCE
Refills: 0 | Status: COMPLETED | OUTPATIENT
Start: 2017-04-12 | End: 2017-04-12

## 2017-04-12 RX ORDER — SODIUM CHLORIDE 9 MG/ML
1000 INJECTION, SOLUTION INTRAVENOUS
Refills: 0 | Status: DISCONTINUED | OUTPATIENT
Start: 2017-04-12 | End: 2017-04-15

## 2017-04-12 RX ORDER — AZITHROMYCIN 500 MG/1
500 TABLET, FILM COATED ORAL ONCE
Refills: 0 | Status: COMPLETED | OUTPATIENT
Start: 2017-04-12 | End: 2017-04-12

## 2017-04-12 RX ORDER — AZITHROMYCIN 500 MG/1
TABLET, FILM COATED ORAL
Refills: 0 | Status: DISCONTINUED | OUTPATIENT
Start: 2017-04-12 | End: 2017-04-14

## 2017-04-12 RX ORDER — MORPHINE SULFATE 50 MG/1
5 CAPSULE, EXTENDED RELEASE ORAL ONCE
Refills: 0 | Status: DISCONTINUED | OUTPATIENT
Start: 2017-04-12 | End: 2017-04-13

## 2017-04-12 RX ORDER — AZITHROMYCIN 500 MG/1
500 TABLET, FILM COATED ORAL EVERY 24 HOURS
Refills: 0 | Status: DISCONTINUED | OUTPATIENT
Start: 2017-04-13 | End: 2017-04-14

## 2017-04-12 RX ORDER — CEFTRIAXONE 500 MG/1
INJECTION, POWDER, FOR SOLUTION INTRAMUSCULAR; INTRAVENOUS
Refills: 0 | Status: DISCONTINUED | OUTPATIENT
Start: 2017-04-12 | End: 2017-04-14

## 2017-04-12 RX ORDER — CEFTRIAXONE 500 MG/1
1 INJECTION, POWDER, FOR SOLUTION INTRAMUSCULAR; INTRAVENOUS EVERY 24 HOURS
Refills: 0 | Status: DISCONTINUED | OUTPATIENT
Start: 2017-04-13 | End: 2017-04-14

## 2017-04-12 RX ORDER — HALOPERIDOL DECANOATE 100 MG/ML
1 INJECTION INTRAMUSCULAR ONCE
Refills: 0 | Status: DISCONTINUED | OUTPATIENT
Start: 2017-04-12 | End: 2017-04-15

## 2017-04-12 RX ADMIN — AZITHROMYCIN 255 MILLIGRAM(S): 500 TABLET, FILM COATED ORAL at 12:44

## 2017-04-12 RX ADMIN — HEPARIN SODIUM 5000 UNIT(S): 5000 INJECTION INTRAVENOUS; SUBCUTANEOUS at 08:19

## 2017-04-12 RX ADMIN — CEFTRIAXONE 100 GRAM(S): 500 INJECTION, POWDER, FOR SOLUTION INTRAMUSCULAR; INTRAVENOUS at 12:44

## 2017-04-12 RX ADMIN — ATORVASTATIN CALCIUM 10 MILLIGRAM(S): 80 TABLET, FILM COATED ORAL at 22:26

## 2017-04-12 RX ADMIN — HEPARIN SODIUM 5000 UNIT(S): 5000 INJECTION INTRAVENOUS; SUBCUTANEOUS at 22:18

## 2017-04-12 RX ADMIN — SENNA PLUS 2 TABLET(S): 8.6 TABLET ORAL at 22:26

## 2017-04-12 RX ADMIN — SODIUM CHLORIDE 75 MILLILITER(S): 9 INJECTION, SOLUTION INTRAVENOUS at 17:40

## 2017-04-12 RX ADMIN — Medication 1 MILLIGRAM(S): at 00:24

## 2017-04-12 NOTE — PROGRESS NOTE ADULT - PROBLEM SELECTOR PLAN 1
Improving renal function. Avoid nephrotoxic meds as possible. Change IVF to half NS. Encourage PO intake as tolerated. Bladder scan to r/o retention.   Will follow lytes and renal function trend. Overall prognosis poor.   D/w pt's daughter at bedside. Supportive care.

## 2017-04-12 NOTE — PROGRESS NOTE ADULT - ASSESSMENT
8F pmhx hld, htn, gerd, recent discharge from [facility] for dysphagia and lymphoma, c/o Disorientation 3 days ago, patient complaining as she could not breath.  Taken to [facility], ct head and throat resulted with suspicion of lymphoma recommending hospice. pt decrease po intake  / dehydrations  admitted kirk 89F pmhx hld, htn, gerd, recent discharge from [facility] for dysphagia and suspected lymphoma, c/o Disorientation 3 days ago, now admitted with altered mental status and CALLIE.

## 2017-04-12 NOTE — PROGRESS NOTE ADULT - PROBLEM SELECTOR PLAN 2
- lactate wnl  - cont hydration  - Speech and swallow eval - recc dysphagia 2 diet - lactate wnl  - cont hydration with IVF and encourage increased PO intake  - Speech and swallow eval - recc dysphagia 2 diet - possible PNA  Bibasilar pulmonary parenchymal nodules. Mild mediastinal adenopathy.   Correlate with chest CT. - possible PNA , CT [place] showed - Bibasilar pulmonary parenchymal nodules. Mild mediastinal adenopathy. Will f/u with Chest CT  - bands 14%, procalcitonin .5  - Started on Rocephin and azithromycin  - started on IVF 75

## 2017-04-12 NOTE — PROGRESS NOTE ADULT - PROBLEM SELECTOR PLAN 4
- stable  - continue with duoneb treatments PRN  - continue with Ocean spray for PRN nasal congestion - controlled  - continue low salt diet  - continue with Amlodipine

## 2017-04-12 NOTE — PROGRESS NOTE ADULT - PROBLEM SELECTOR PLAN 7
- CT at [facility] showed - multiple lymphadenopathies, B/L renal indeterminate masses, B/L pulm nodules.  - Heme/onc consulted - findings highly suspicious for met malignancy, ?lymphoma, ?solid tumor such as kidney and other sources. Prognosis poor and they discussed at length with daughter who does not want aggressive measures. Daughter wishes for mother to be optimized at rehab and then to be sent to hospice  - will f/u with - CT at [facility] showed - multiple lymphadenopathies, B/L renal indeterminate masses, B/L pulm nodules.  - Heme/onc consulted - findings highly suspicious for met malignancy, ?lymphoma, ?solid tumor such as kidney and other sources. Prognosis poor and they discussed at length with daughter who does not want aggressive measures

## 2017-04-12 NOTE — PROGRESS NOTE ADULT - SUBJECTIVE AND OBJECTIVE BOX
Patient is a 89y old  Female who presents with a chief complaint of weakness, pt unable to walk (10 Apr 2017 13:50)      HPI: c/c weakness   89F pmhx hld, htn, gerd, recent discharge from [facility] for dysphagia and lymphoma, c/o Disorientation 3 days ago, patient complaining as she could not breath.  Taken to [facility], ct head and throat resulted with suspicion of lymphoma recommending hospice.  After discharge, patient was exhausted and disorientation persisted 2 days ago. pt decrease po intake  / dehydrations     Daughter denies any history of heart failure or kidney disease.  BUN/Cr (17 [facility])= 39 / 1.82. (10 Apr 2017 12:26)       INTERVAL HPI/OVERNIGHT EVENTS:  Vital Signs Last 24 Hrs  T(C): 37.2, Max: 37.2 (-12 @ 06:02)  T(F): 98.9, Max: 98.9 (12 @ 06:02)  HR: 78 (74 - 78)  BP: 101/61 (101/61 - 117/59)  BP(mean): --  RR: 16 (16 - 18)  SpO2: 95% (91% - 96%)    I AND oS????????????????????????????????????????????????????????????????????????????????????????????????????????????????????????????????    REVIEW OF SYSTEMS:  CONSTITUTIONAL: weakness / fatigue   EYES: No eye pain, visual disturbances, or discharge  ENMT:  No difficulty hearing, tinnitus, vertigo; No sinus or throat pain  NECK: No pain or stiffness  RESPIRATORY: No cough, wheezing, chills or hemoptysis; No shortness of breath  CARDIOVASCULAR: No chest pain, palpitations, dizziness, or leg swelling  GASTROINTESTINAL: No abdominal or epigastric pain. No nausea, vomiting, or hematemesis ;constipation present  GENITOURINARY: No dysuria, frequency, hematuria, or incontinence  NEUROLOGICAL: No headaches, memory loss, loss of strength, numbness, or tremors  SKIN: No itching, burning, rashes, or lesions   MUSCULOSKELETAL joint pain shoulder ; No muscle, back, or extremity pain    PHYSICAL EXAM:  GENERAL: lethargic   HEAD:  Atraumatic, Normocephalic  EYES: EOMI, PERRLA, conjunctiva and sclera clear  ENMT: intact   NECK: Supple,   NERVOUS SYSTEM:  Alert & Oriented X1,  CHEST/LUNG: Clear to percussion bilaterally; No rales, rhonchi, wheezing,   HEART: Regular rate and rhythm; No murmurs, rubs, or gallops  GI : Soft,  obese , Nontender, Nondistended; Bowel sounds present  EXTREMITIES:  2+ Peripheral Pulses, No clubbing, cyanosis, or edema  LYMPH: No lymphadenopathy noted  SKIN: No rash    INTACT     MEDICATIONS  (STANDING):  heparin  Injectable 5000Unit(s) SubCutaneous every 12 hours  sodium chloride 0.9%. 1000milliLiter(s) IV Continuous <Continuous>  predniSONE   Tablet 5milliGRAM(s) Oral daily  atorvastatin 10milliGRAM(s) Oral at bedtime  amLODIPine   Tablet 5milliGRAM(s) Oral daily  pantoprazole    Tablet 40milliGRAM(s) Oral before breakfast  allopurinol 100milliGRAM(s) Oral daily  senna 2Tablet(s) Oral at bedtime  docusate sodium 100milliGRAM(s) Oral two times a day        LABS:                                   11.2   8.4   )-----------( 90       ( 2017 08:40 )             34.7     04-12    143  |  114<H>  |  49<H>  ----------------------------<  63<L>  4.9   |  18<L>  |  2.80<H>    Ca    7.8<L>      2017 08:40  Phos  5.2     04-11  Mg     2.0     04-12      Urinalysis Basic - ( 10 Apr 2017 08:48 )    Color: Yellow / Appearance: Clear / S.020 / pH: x  Gluc: x / Ketone: Trace  / Bili: Small / Urobili: 1   Blood: x / Protein: 75 mg/dL / Nitrite: Negative   Leuk Esterase: Trace / RBC: 0-2 /HPF / WBC 0-2   Sq Epi: x / Non Sq Epi: Occasional / Bacteria: x      RADIOLOGY & ADDITIONAL TESTS:    Imaging Personally Reviewed: YES CT SCAN ABD RENAL CYST present     B/L Renal US:Multiple bilateral renal cysts.  The indeterminate lesions described on CT the same day not visualized on   this limited study. As such MRI is suggested    CT ABD/PELVIS: No hydroureteronephrosis or urolithiasis.  Bilateral indeterminate renal cortical lesions as described. Correlate   with MR  Nonspecific mesenteric panniculitis.  Bibasilar pulmonary parenchymal nodules. Mild mediastinal adenopathy.   Correlate with chest CT.    Advance Directives: DNR Patient is a 89y old  Female who presents with a chief complaint of weakness, pt unable to walk (10 Apr 2017 13:50)      HPI: c/c weakness   89F PMHx HLD, HTN, GERD, with recent discharge from [facility] for dysphagia and lymphoma, c/o disorientation 3 days ago, patient complaining as she could not breath.  There CT head and throat resulted with suspicion of lymphoma recommending hospice.  After discharge, patient was exhausted and disorientation persisted 2 days ago. Pt has decreased PO intake with dehydrations so daughter brought her to  ED.     Daughter denies any history of heart failure or kidney disease.  BUN/Cr (17 [facility])= 39 / 1.82. (10 Apr 2017 12:26)       INTERVAL HPI/OVERNIGHT EVENTS:  Vital Signs Last 24 Hrs  T(C): 37.2, Max: 37.2 (-12 @ 06:02)  T(F): 98.9, Max: 98.9 (12 @ 06:02)  HR: 78 (74 - 78)  BP: 101/61 (101/61 - 117/59)  BP(mean): --  RR: 16 (16 - 18)  SpO2: 95% (91% - 96%)    I AND oS????????????????????????????????????????????????????????????????????????????????????????????????????????????????????????????????    REVIEW OF SYSTEMS:  CONSTITUTIONAL: weakness / fatigue   EYES: No eye pain, visual disturbances, or discharge  ENMT:  No difficulty hearing, tinnitus, vertigo; No sinus or throat pain  NECK: No pain or stiffness  RESPIRATORY: No cough, wheezing, chills or hemoptysis; No shortness of breath  CARDIOVASCULAR: No chest pain, palpitations, dizziness, or leg swelling  GASTROINTESTINAL: No abdominal or epigastric pain. No nausea, vomiting, or hematemesis, constipation present, decreased appetite   GENITOURINARY: No dysuria, frequency, hematuria, or incontinence  NEUROLOGICAL: No headaches, memory loss, decreased strength due to weakness, numbness, or tremors  SKIN: No itching, burning, rashes, or lesions   MUSCULOSKELETAL pain in b/l shoulders and hips due to arthritis, No muscle, back, or extremity pain    PHYSICAL EXAM:  GENERAL: lethargic   HEAD:  Atraumatic, Normocephalic  EYES: EOMI, PERRLA, conjunctiva and sclera clear  ENMT: intact   NECK: Supple,   NERVOUS SYSTEM:  Alert & Oriented X1,  CHEST/LUNG: Clear to percussion bilaterally; No rales, rhonchi, wheezing,   HEART: Regular rate and rhythm; No murmurs, rubs, or gallops  GI : Soft,  obese , Nontender, Nondistended; Bowel sounds present  EXTREMITIES:  2+ Peripheral Pulses, No clubbing, cyanosis, or edema  LYMPH: No lymphadenopathy noted  SKIN: No rash    INTACT     MEDICATIONS  (STANDING):  heparin  Injectable 5000Unit(s) SubCutaneous every 12 hours  sodium chloride 0.9%. 1000milliLiter(s) IV Continuous <Continuous>  predniSONE   Tablet 5milliGRAM(s) Oral daily  atorvastatin 10milliGRAM(s) Oral at bedtime  amLODIPine   Tablet 5milliGRAM(s) Oral daily  pantoprazole    Tablet 40milliGRAM(s) Oral before breakfast  allopurinol 100milliGRAM(s) Oral daily  senna 2Tablet(s) Oral at bedtime  docusate sodium 100milliGRAM(s) Oral two times a day        LABS:                                   11.2   8.4   )-----------( 90       ( 2017 08:40 )             34.7     04-12    143  |  114<H>  |  49<H>  ----------------------------<  63<L>  4.9   |  18<L>  |  2.80<H>    Ca    7.8<L>      2017 08:40  Phos  5.2     04-11  Mg     2.0     04-12      Urinalysis Basic - ( 10 Apr 2017 08:48 )    Color: Yellow / Appearance: Clear / S.020 / pH: x  Gluc: x / Ketone: Trace  / Bili: Small / Urobili: 1   Blood: x / Protein: 75 mg/dL / Nitrite: Negative   Leuk Esterase: Trace / RBC: 0-2 /HPF / WBC 0-2   Sq Epi: x / Non Sq Epi: Occasional / Bacteria: x      RADIOLOGY & ADDITIONAL TESTS:    Imaging Personally Reviewed: YES CT SCAN ABD RENAL CYST present     B/L Renal US:Multiple bilateral renal cysts.  The indeterminate lesions described on CT the same day not visualized on   this limited study. As such MRI is suggested    CT ABD/PELVIS: No hydroureteronephrosis or urolithiasis.  Bilateral indeterminate renal cortical lesions as described. Correlate   with MR  Nonspecific mesenteric panniculitis.  Bibasilar pulmonary parenchymal nodules. Mild mediastinal adenopathy.   Correlate with chest CT.    Advance Directives: DNR Patient is a 89y old  Female who presents with a chief complaint of weakness, pt unable to walk (10 Apr 2017 13:50)      HPI: c/c weakness   89F PMHx HLD, HTN, GERD, with recent discharge from [facility] for dysphagia and lymphoma, c/o disorientation 3 days ago, patient complaining as she could not breath.  There CT head and throat resulted with suspicion of lymphoma recommending hospice.  After discharge, patient was exhausted and disorientation persisted 2 days ago. Pt has decreased PO intake with dehydrations so daughter brought her to [facility]. Pt became very confused and agitated overnight requiring ativan. Pt is still confused and trying to take off clothing and taking out IV. According to pts family this is not how she was just a few days ago. Family states she is usually walking around and talking.    Daughter denies any history of heart failure or kidney disease.  BUN/Cr (17 [facility])= 39 / 1.82. (10 Apr 2017 12:26)       INTERVAL HPI/OVERNIGHT EVENTS:  Vital Signs Last 24 Hrs  T(C): 37.2, Max: 37.2 (04-12 @ 06:02)  T(F): 98.9, Max: 98.9 (04-12 @ 06:02)  HR: 78 (74 - 78)  BP: 101/61 (101/61 - 117/59)  BP(mean): --  RR: 16 (16 - 18)  SpO2: 95% (91% - 96%)    I&O's Detail    I & Os for current day (as of 2017 13:23)  =============================================  IN:    sodium chloride 0.9%: 1470 ml    Total IN: 1470 ml  ---------------------------------------------  OUT:    Total OUT: 0 ml  ---------------------------------------------  Total NET: 1470 ml      REVIEW OF SYSTEMS: unable to obtain due to confusion/agitation       PHYSICAL EXAM:  GENERAL: lethargic and agitated   HEAD:  Atraumatic, Normocephalic  EYES: EOMI, PERRLA, conjunctiva and sclera clear  ENMT: intact   NECK: Supple,   NERVOUS SYSTEM:  Alert & Oriented X0,  CHEST/LUNG: Clear to percussion bilaterally; No rales, rhonchi, wheezing,   HEART: Regular rate and rhythm; No murmurs, rubs, or gallops  GI : Soft,  obese , Nontender, Nondistended; Bowel sounds present  EXTREMITIES:  2+ Peripheral Pulses, No clubbing, cyanosis, or edema  LYMPH: No lymphadenopathy noted  SKIN: No rash      MEDICATIONS  (STANDING):  heparin  Injectable 5000Unit(s) SubCutaneous every 12 hours  predniSONE   Tablet 5milliGRAM(s) Oral daily  atorvastatin 10milliGRAM(s) Oral at bedtime  amLODIPine   Tablet 5milliGRAM(s) Oral daily  pantoprazole    Tablet 40milliGRAM(s) Oral before breakfast  allopurinol 100milliGRAM(s) Oral daily  senna 2Tablet(s) Oral at bedtime  docusate sodium 100milliGRAM(s) Oral two times a day  sodium chloride 0.45%. 1000milliLiter(s) IV Continuous <Continuous>  morphine   Solution 5milliGRAM(s) Oral once  cefTRIAXone   IVPB  IV Intermittent   azithromycin  IVPB  IV Intermittent       LABS:                                   11.2   8.4   )-----------( 90       ( 2017 08:40 )             34.7     04-12    143  |  114<H>  |  49<H>  ----------------------------<  63<L>  4.9   |  18<L>  |  2.80<H>    Ca    7.8<L>      2017 08:40  Phos  5.2     04-11  Mg     2.0     04-12      Urinalysis Basic - ( 10 Apr 2017 08:48 )    Color: Yellow / Appearance: Clear / S.020 / pH: x  Gluc: x / Ketone: Trace  / Bili: Small / Urobili: 1   Blood: x / Protein: 75 mg/dL / Nitrite: Negative   Leuk Esterase: Trace / RBC: 0-2 /HPF / WBC 0-2   Sq Epi: x / Non Sq Epi: Occasional / Bacteria: x      RADIOLOGY & ADDITIONAL TESTS:    Imaging Personally Reviewed: YES CT SCAN ABD RENAL CYST present     B/L Renal US:Multiple bilateral renal cysts.  The indeterminate lesions described on CT the same day not visualized on   this limited study. As such MRI is suggested    CT ABD/PELVIS: No hydroureteronephrosis or urolithiasis.  Bilateral indeterminate renal cortical lesions as described. Correlate   with MR. Nonspecific mesenteric panniculitis.  Bibasilar pulmonary parenchymal nodules. Mild mediastinal adenopathy.   Correlate with chest CT.    Advance Directives: DNR

## 2017-04-12 NOTE — PROGRESS NOTE ADULT - PROBLEM SELECTOR PLAN 1
- CALLIE  on CKD ,   - GFR 13, BUN 49 from 45 yesterday, Cr 2.8 from 2.7 yesterday, Cl 114, CO2 18, Ca 7.8, Na 143, K 4.9, f/u electrolyte in AM  - Decreased IVF to 60 from 100 as per nephro recc  - Nephro consulted  - PMHx of  CALLIE. The most likley reason for the CALLIE is due to pre renal azotemia. She has a baseline creatinine of 1.8 about 3 weeks ago at [facility]. Continue gently hydration, change IVF to half NS at 60 cc an hour.  over all prognosis is poor. Continue comfort care. Suggested hospice to patient's daughter. - CALLIE  on CKD3?    - GFR 13, BUN 49 from 45 yesterday, Cr 2.8 from 2.7 yesterday, Cl 114, CO2 18, Ca 7.8, Na 143, K 4.9, f/u electrolyte in AM  - Pt seen by Dr. Velez, nephro - Improving renal function. Changed IVF to half NS. Bladder scan to r/o retention - will f/u

## 2017-04-12 NOTE — PROGRESS NOTE ADULT - PROBLEM SELECTOR PLAN 5
- resolved after repletion - 2/2 CALLIE - resolved after repletion - likely 2/2 CALLIE - stable  - continue with duoneb treatments PRN  - continue with Ocean spray for PRN nasal congestion

## 2017-04-12 NOTE — PROGRESS NOTE ADULT - ATTENDING COMMENTS
Will treat for possible pneumonia. AMS likely metabolic encephalopathy 2/2 uremia and possible pneumonia. Will monitor Creatinine and mental status. If patient shows any improvement (was AAOx3 5 days ago per family) will consider possible d/c home for home hospice, but if no improvement will move toward comfort measures and possible admission to hospice inn.

## 2017-04-12 NOTE — PROGRESS NOTE ADULT - PROBLEM SELECTOR PLAN 3
- controlled  - continue low salt diet  - continue with Amlodipine - lactate wnl  - cont hydration with IVF and encourage increased PO intake  - Speech and swallow eval - recc dysphagia 2 diet

## 2017-04-12 NOTE — PROGRESS NOTE ADULT - SUBJECTIVE AND OBJECTIVE BOX
Patient seen in follow up for CALLIE, ? CKD 3. Pt resting in bed. Was confused and agitated last night.     PAST MEDICAL HISTORY:  Gout  Dysphagia  COPD (chronic obstructive pulmonary disease)  Acute kidney injury  GERD (gastroesophageal reflux disease)  CKD (chronic kidney disease)  Hyperlipidemia  HTN (hypertension)    MEDICATIONS  (STANDING):  heparin  Injectable 5000Unit(s) SubCutaneous every 12 hours  predniSONE   Tablet 5milliGRAM(s) Oral daily  atorvastatin 10milliGRAM(s) Oral at bedtime  amLODIPine   Tablet 5milliGRAM(s) Oral daily  pantoprazole    Tablet 40milliGRAM(s) Oral before breakfast  allopurinol 100milliGRAM(s) Oral daily  senna 2Tablet(s) Oral at bedtime  docusate sodium 100milliGRAM(s) Oral two times a day  sodium chloride 0.45%. 1000milliLiter(s) IV Continuous <Continuous>    MEDICATIONS  (PRN):  ALBUTerol/ipratropium for Nebulization 3milliLiter(s) Nebulizer every 6 hours PRN Shortness of Breath and/or Wheezing  sodium chloride 0.65% Nasal 1Spray(s) Both Nostrils once PRN Nasal Congestion    T(C): 37.2, Max: 37.2 (04-12 @ 06:02)  HR: 78 (74 - 87)  BP: 101/61 (101/61 - 117/59)  RR: 16 (16 - 18)  SpO2: 95% (91% - 96%)  Wt(kg): --  I&O's Detail    I & Os for current day (as of 12 Apr 2017 10:07)  =============================================  IN:    sodium chloride 0.9%: 1470 ml    Total IN: 1470 ml  ---------------------------------------------  OUT:    Total OUT: 0 ml  ---------------------------------------------  Total NET: 1470 ml      PHYSICAL EXAM:  General: NAD  Respiratory: b/l air entry  Cardiovascular: S1 S2  Gastrointestinal: soft  Extremities:  no edema    CBC Full  -  ( 12 Apr 2017 08:40 )  WBC Count : 8.4 K/uL  Hemoglobin : 11.2 g/dL  Hematocrit : 34.7 %  Platelet Count - Automated : 90 K/uL  Mean Cell Volume : 94.9 fl  Mean Cell Hemoglobin : 30.8 pg  Mean Cell Hemoglobin Concentration : 32.4 gm/dL  Auto Neutrophil # : x  Auto Lymphocyte # : x  Auto Monocyte # : x  Auto Eosinophil # : x  Auto Basophil # : x  Auto Neutrophil % : x  Auto Lymphocyte % : x  Auto Monocyte % : x  Auto Eosinophil % : x  Auto Basophil % : x    04-12    143  |  114<H>  |  49<H>  ----------------------------<  63<L>  4.9   |  18<L>  |  2.80<H>    Ca    7.8<L>      12 Apr 2017 08:40  Phos  5.2     04-11  Mg     2.0     04-12          Sodium, Serum: 143 (04-12 @ 08:40)  Sodium, Serum: 141 (04-11 @ 07:57)  Sodium, Serum: 131 (04-10 @ 08:14)    Creatinine, Serum: 2.80 (04-12 @ 08:40)  Creatinine, Serum: 2.70 (04-11 @ 07:57)  Creatinine, Serum: 3.10 (04-10 @ 08:14)    Potassium, Serum: 4.9 (04-12 @ 08:40)  Potassium, Serum: 4.5 (04-11 @ 07:57)  Potassium, Serum: 4.8 (04-10 @ 08:14)    Hemoglobin: 11.2 (04-12 @ 08:40)  Hemoglobin: 11.1 (04-11 @ 07:57)  Hemoglobin: 12.8 (04-10 @ 08:14)

## 2017-04-12 NOTE — PROGRESS NOTE ADULT - PROBLEM SELECTOR PLAN 6
- CT at [facility] showed - multiple lymphadenopathies, B/L renal indeterminate masses, B/L pulm nodules.  - Heme/onc consulted - findings highly suspicious for met malignancy, ?lymphoma, ?solid tumor such as kidney and other sources. Prognosis poor and they discussed at length with daughter who does not want aggressive measures. Daughter wishes for mother to be optimized at rehab and then to be sent to hospice  - will f/u with - resolved after repletion - likely 2/2 CALLIE

## 2017-04-13 DIAGNOSIS — R41.82 ALTERED MENTAL STATUS, UNSPECIFIED: ICD-10-CM

## 2017-04-13 DIAGNOSIS — M79.673 PAIN IN UNSPECIFIED FOOT: ICD-10-CM

## 2017-04-13 LAB
ANION GAP SERPL CALC-SCNC: 11 MMOL/L — SIGNIFICANT CHANGE UP (ref 5–17)
BUN SERPL-MCNC: 53 MG/DL — HIGH (ref 7–23)
CALCIUM SERPL-MCNC: 8 MG/DL — LOW (ref 8.5–10.1)
CHLORIDE SERPL-SCNC: 112 MMOL/L — HIGH (ref 96–108)
CO2 SERPL-SCNC: 17 MMOL/L — LOW (ref 22–31)
CREAT SERPL-MCNC: 2.9 MG/DL — HIGH (ref 0.5–1.3)
GLUCOSE SERPL-MCNC: 73 MG/DL — SIGNIFICANT CHANGE UP (ref 70–99)
HCT VFR BLD CALC: 36.8 % — SIGNIFICANT CHANGE UP (ref 34.5–45)
HGB BLD-MCNC: 12 G/DL — SIGNIFICANT CHANGE UP (ref 11.5–15.5)
MAGNESIUM SERPL-MCNC: 2 MG/DL — SIGNIFICANT CHANGE UP (ref 1.8–2.4)
MCHC RBC-ENTMCNC: 30.8 PG — SIGNIFICANT CHANGE UP (ref 27–34)
MCHC RBC-ENTMCNC: 32.6 GM/DL — SIGNIFICANT CHANGE UP (ref 32–36)
MCV RBC AUTO: 94.3 FL — SIGNIFICANT CHANGE UP (ref 80–100)
PHOSPHATE SERPL-MCNC: 5.7 MG/DL — HIGH (ref 2.5–4.5)
PLATELET # BLD AUTO: 86 K/UL — LOW (ref 150–400)
POTASSIUM SERPL-MCNC: 4.9 MMOL/L — SIGNIFICANT CHANGE UP (ref 3.5–5.3)
POTASSIUM SERPL-SCNC: 4.9 MMOL/L — SIGNIFICANT CHANGE UP (ref 3.5–5.3)
RBC # BLD: 3.9 M/UL — SIGNIFICANT CHANGE UP (ref 3.8–5.2)
RBC # FLD: 14.6 % — HIGH (ref 10.3–14.5)
SODIUM SERPL-SCNC: 140 MMOL/L — SIGNIFICANT CHANGE UP (ref 135–145)
WBC # BLD: 8 K/UL — SIGNIFICANT CHANGE UP (ref 3.8–10.5)
WBC # FLD AUTO: 8 K/UL — SIGNIFICANT CHANGE UP (ref 3.8–10.5)

## 2017-04-13 PROCEDURE — 71250 CT THORAX DX C-: CPT | Mod: 26

## 2017-04-13 PROCEDURE — 99233 SBSQ HOSP IP/OBS HIGH 50: CPT | Mod: GC

## 2017-04-13 RX ORDER — MORPHINE SULFATE 50 MG/1
1 CAPSULE, EXTENDED RELEASE ORAL EVERY 4 HOURS
Refills: 0 | Status: DISCONTINUED | OUTPATIENT
Start: 2017-04-13 | End: 2017-04-13

## 2017-04-13 RX ORDER — LIDOCAINE 4 G/100G
2 CREAM TOPICAL
Refills: 0 | Status: DISCONTINUED | OUTPATIENT
Start: 2017-04-13 | End: 2017-04-15

## 2017-04-13 RX ORDER — MORPHINE SULFATE 50 MG/1
2 CAPSULE, EXTENDED RELEASE ORAL
Refills: 0 | Status: DISCONTINUED | OUTPATIENT
Start: 2017-04-13 | End: 2017-04-15

## 2017-04-13 RX ORDER — NYSTATIN CREAM 100000 [USP'U]/G
1 CREAM TOPICAL
Refills: 0 | Status: DISCONTINUED | OUTPATIENT
Start: 2017-04-13 | End: 2017-04-15

## 2017-04-13 RX ORDER — RISPERIDONE 4 MG/1
0.25 TABLET ORAL
Refills: 0 | Status: DISCONTINUED | OUTPATIENT
Start: 2017-04-13 | End: 2017-04-15

## 2017-04-13 RX ADMIN — HEPARIN SODIUM 5000 UNIT(S): 5000 INJECTION INTRAVENOUS; SUBCUTANEOUS at 09:27

## 2017-04-13 RX ADMIN — SODIUM CHLORIDE 75 MILLILITER(S): 9 INJECTION, SOLUTION INTRAVENOUS at 05:27

## 2017-04-13 RX ADMIN — Medication 100 MILLIGRAM(S): at 05:25

## 2017-04-13 RX ADMIN — Medication 5 MILLIGRAM(S): at 05:25

## 2017-04-13 RX ADMIN — Medication 100 MILLIGRAM(S): at 18:37

## 2017-04-13 RX ADMIN — RISPERIDONE 0.25 MILLIGRAM(S): 4 TABLET ORAL at 18:37

## 2017-04-13 RX ADMIN — Medication 100 MILLIGRAM(S): at 12:09

## 2017-04-13 RX ADMIN — MORPHINE SULFATE 1 MILLIGRAM(S): 50 CAPSULE, EXTENDED RELEASE ORAL at 13:19

## 2017-04-13 RX ADMIN — LIDOCAINE 2 MILLILITER(S): 4 CREAM TOPICAL at 18:37

## 2017-04-13 RX ADMIN — NYSTATIN CREAM 1 APPLICATION(S): 100000 CREAM TOPICAL at 18:37

## 2017-04-13 RX ADMIN — PANTOPRAZOLE SODIUM 40 MILLIGRAM(S): 20 TABLET, DELAYED RELEASE ORAL at 09:23

## 2017-04-13 RX ADMIN — MORPHINE SULFATE 1 MILLIGRAM(S): 50 CAPSULE, EXTENDED RELEASE ORAL at 13:45

## 2017-04-13 RX ADMIN — HEPARIN SODIUM 5000 UNIT(S): 5000 INJECTION INTRAVENOUS; SUBCUTANEOUS at 22:36

## 2017-04-13 RX ADMIN — SENNA PLUS 2 TABLET(S): 8.6 TABLET ORAL at 22:35

## 2017-04-13 RX ADMIN — AZITHROMYCIN 255 MILLIGRAM(S): 500 TABLET, FILM COATED ORAL at 16:30

## 2017-04-13 RX ADMIN — ATORVASTATIN CALCIUM 10 MILLIGRAM(S): 80 TABLET, FILM COATED ORAL at 22:35

## 2017-04-13 RX ADMIN — CEFTRIAXONE 100 GRAM(S): 500 INJECTION, POWDER, FOR SOLUTION INTRAMUSCULAR; INTRAVENOUS at 12:09

## 2017-04-13 NOTE — PROGRESS NOTE ADULT - SUBJECTIVE AND OBJECTIVE BOX
Patient is a 89y old  Female who presents with a chief complaint of weakness, pt unable to walk      HPI: c/c weakness   89F PMHx HLD, HTN, GERD, with recent discharge from [facility] for dysphagia and lymphoma, c/o disorientation 3 days ago, patient complaining as she could not breath.  There CT head and throat resulted with suspicion of lymphoma recommending hospice.  After discharge, patient was exhausted and disorientation persisted 2 days ago. Pt has decreased PO intake with dehydrations so [facility]. According to pts family this is not how she was just a few days ago. Family states she is usually walking around and talking. Pt is still confused and trying to take off clothing off overnight.     Daughter denies any history of heart failure or kidney disease.  BUN/Cr (17 [facility])= 39 / 1.82. (10 Apr 2017 12:26)       Vital Signs Last 24 Hrs  T(C): 36.7, Max: 37.4 (04-12 @ 20:52)  T(F): 98.1, Max: 99.4 (04-12 @ 20:52)  HR: 90 (79 - 90)  BP: 94/48 (94/48 - 104/54)  BP(mean): --  RR: 18 (17 - 18)  SpO2: 83% (83% - 96%)    I&O's Detail    I & Os for current day (as of 2017 08:47)  =============================================  IN:    sodium chloride 0.45%.: 675 ml    Solution: 250 ml    sodium chloride 0.9%: 210 ml    Solution: 50 ml    Total IN: 1185 ml  ---------------------------------------------  OUT:    Total OUT: 0 ml  ---------------------------------------------  Total NET: 1185 ml      REVIEW OF SYSTEMS: unable to obtain due to confusion/agitation       PHYSICAL EXAM:  GENERAL: lethargic and agitated   HEAD:  Atraumatic, Normocephalic  EYES: EOMI, PERRLA, conjunctiva and sclera clear  ENMT: intact   NECK: Supple,   NERVOUS SYSTEM:  Alert & Oriented X0,  CHEST/LUNG: Clear to percussion bilaterally; No rales, rhonchi, wheezing,   HEART: Regular rate and rhythm; No murmurs, rubs, or gallops  GI : Soft,  obese , Nontender, Nondistended; Bowel sounds present  EXTREMITIES:  2+ Peripheral Pulses, No clubbing, cyanosis, or edema  LYMPH: No lymphadenopathy noted  SKIN: No rash      MEDICATIONS  (STANDING):  heparin  Injectable 5000Unit(s) SubCutaneous every 12 hours  predniSONE   Tablet 5milliGRAM(s) Oral daily  atorvastatin 10milliGRAM(s) Oral at bedtime  amLODIPine   Tablet 5milliGRAM(s) Oral daily  pantoprazole    Tablet 40milliGRAM(s) Oral before breakfast  allopurinol 100milliGRAM(s) Oral daily  senna 2Tablet(s) Oral at bedtime  docusate sodium 100milliGRAM(s) Oral two times a day  sodium chloride 0.45%. 1000milliLiter(s) IV Continuous <Continuous>  cefTRIAXone   IVPB  IV Intermittent   azithromycin  IVPB  IV Intermittent   cefTRIAXone   IVPB 1Gram(s) IV Intermittent every 24 hours  azithromycin  IVPB 500milliGRAM(s) IV Intermittent every 24 hours      LABS:                                   11.2   8.4   )-----------( 90       ( 2017 08:40 )             34.7     04-12    143  |  114<H>  |  49<H>  ----------------------------<  63<L>  4.9   |  18<L>  |  2.80<H>    Ca    7.8<L>      2017 08:40  Phos  5.2     04-11  Mg     2.0     04-12      Urinalysis Basic - ( 10 Apr 2017 08:48 )    Color: Yellow / Appearance: Clear / S.020 / pH: x  Gluc: x / Ketone: Trace  / Bili: Small / Urobili: 1   Blood: x / Protein: 75 mg/dL / Nitrite: Negative   Leuk Esterase: Trace / RBC: 0-2 /HPF / WBC 0-2   Sq Epi: x / Non Sq Epi: Occasional / Bacteria: x      RADIOLOGY & ADDITIONAL TESTS:    Imaging Personally Reviewed: YES CT SCAN ABD RENAL CYST present     B/L Renal US:Multiple bilateral renal cysts.  The indeterminate lesions described on CT the same day not visualized on   this limited study. As such MRI is suggested    CT ABD/PELVIS: No hydroureteronephrosis or urolithiasis.  Bilateral indeterminate renal cortical lesions as described. Correlate   with MR. Nonspecific mesenteric panniculitis.  Bibasilar pulmonary parenchymal nodules. Mild mediastinal adenopathy.   Correlate with chest CT.    Advance Directives: DNR Patient is a 89y old  Female who presents with a chief complaint of weakness, pt unable to walk      HPI: c/c weakness   89F PMHx HLD, HTN, GERD, with recent discharge from [facility] for dysphagia and lymphoma, c/o disorientation 3 days ago, patient complaining as she could not breath.  There CT head and throat resulted with suspicion of lymphoma recommending hospice.  After discharge, patient was exhausted and disorientation persisted 2 days ago. Pt has decreased PO intake with dehydrations so [facility]. According to pts family this is not how she was just a few days ago. Family states she is usually walking around and talking. Pt is still confused and trying to take off clothing off overnight.     Daughter denies any history of heart failure or kidney disease.  BUN/Cr (17 [facility])= 39 / 1.82. (10 Apr 2017 12:26)       Vital Signs Last 24 Hrs  T(C): 36.7, Max: 37.4 (04-12 @ 20:52)  T(F): 98.1, Max: 99.4 (04-12 @ 20:52)  HR: 90 (79 - 90)  BP: 94/48 (94/48 - 104/54)  BP(mean): --  RR: 18 (17 - 18)  SpO2: 83% (83% - 96%)    I&O's Detail    I & Os for current day (as of 2017 08:47)  =============================================  IN:    sodium chloride 0.45%.: 675 ml    Solution: 250 ml    sodium chloride 0.9%: 210 ml    Solution: 50 ml    Total IN: 1185 ml  ---------------------------------------------  OUT:    Total OUT: 0 ml  ---------------------------------------------  Total NET: 1185 ml      REVIEW OF SYSTEMS: unable to obtain due to confusion/agitation       PHYSICAL EXAM:  GENERAL: lethargic and agitated   HEAD:  Atraumatic, Normocephalic  EYES: EOMI, PERRLA, conjunctiva and sclera clear  ENMT: intact   NECK: Supple,   NERVOUS SYSTEM:  Alert & Oriented X0,  CHEST/LUNG: Clear to percussion bilaterally; No rales, rhonchi, wheezing,   HEART: Regular rate and rhythm; No murmurs, rubs, or gallops  GI : Soft,  obese , Nontender, Nondistended; Bowel sounds present  EXTREMITIES:  2+ Peripheral Pulses, No clubbing, cyanosis, or edema  LYMPH: No lymphadenopathy noted  SKIN: No rash      MEDICATIONS  (STANDING):  heparin  Injectable 5000Unit(s) SubCutaneous every 12 hours  predniSONE   Tablet 5milliGRAM(s) Oral daily  atorvastatin 10milliGRAM(s) Oral at bedtime  amLODIPine   Tablet 5milliGRAM(s) Oral daily  pantoprazole    Tablet 40milliGRAM(s) Oral before breakfast  allopurinol 100milliGRAM(s) Oral daily  senna 2Tablet(s) Oral at bedtime  docusate sodium 100milliGRAM(s) Oral two times a day  sodium chloride 0.45%. 1000milliLiter(s) IV Continuous <Continuous>  cefTRIAXone   IVPB  IV Intermittent   azithromycin  IVPB  IV Intermittent   cefTRIAXone   IVPB 1Gram(s) IV Intermittent every 24 hours  azithromycin  IVPB 500milliGRAM(s) IV Intermittent every 24 hours      LABS:                                   11.2   8.4   )-----------( 90       ( 2017 08:40 )             34.7     04-12    143  |  114<H>  |  49<H>  ----------------------------<  63<L>  4.9   |  18<L>  |  2.80<H>    Ca    7.8<L>      2017 08:40  Phos  5.2     04-11  Mg     2.0     04-12      Urinalysis Basic - ( 10 Apr 2017 08:48 )    Color: Yellow / Appearance: Clear / S.020 / pH: x  Gluc: x / Ketone: Trace  / Bili: Small / Urobili: 1   Blood: x / Protein: 75 mg/dL / Nitrite: Negative   Leuk Esterase: Trace / RBC: 0-2 /HPF / WBC 0-2   Sq Epi: x / Non Sq Epi: Occasional / Bacteria: x      RADIOLOGY & ADDITIONAL TESTS:    Imaging Personally Reviewed: YES CT SCAN ABD RENAL CYST present     B/L Renal US:Multiple bilateral renal cysts.  The indeterminate lesions described on CT the same day not visualized on   this limited study. As such MRI is suggested    CT ABD/PELVIS: No hydroureteronephrosis or urolithiasis.  Bilateral indeterminate renal cortical lesions as described. Correlate   with MR. Nonspecific mesenteric panniculitis.  Bibasilar pulmonary parenchymal nodules. Mild mediastinal adenopathy.   Correlate with chest CT.        CT CHEST - Numerous pathologically enlarged lymph nodes within the left axilla,   mediastinum, and edwar, worrisome for a neoplastic process such as   lymphoma. The large lymph node in the left axilla measures approximately   2.3 x 1.8 cm.    Tiny bilateral pleural effusions are present. Evaluation of the lung   parenchyma is limited secondary to breathing motion artifact. There is   suggestion of several scattered 4 to 5 mm nodules in the bilateral lungs.     Splenomegaly.    1.8 x 1.6 cm hypoattenuating nodule within the right lobe of the thyroid,   for which ultrasound of the thyroid may be obtained for further   evaluation.      Advance Directives: DNR Patient is a 89y old  Female who presents with a chief complaint of weakness, pt unable to walk      HPI: c/c weakness   89F PMHx HLD, HTN, GERD, with recent discharge from [facility] for dysphagia and lymphoma, c/o disorientation 3 days ago, patient complaining as she could not breath.  There CT head and throat resulted with suspicion of lymphoma recommending hospice.  After discharge, patient was exhausted and disorientation persisted 2 days ago. Pt has decreased PO intake with dehydrations so [facility]. According to pts family this is not how she was just a few days ago. Family states she is usually walking around and talking. Pt is still confused and trying to take off clothing off overnight as the previous night. Pt family is at bedside. Daughter that is the health care proxy is very realistic and understands the likely lymphoma dx. She does not want anything invasive such as a biopsy and wishes for comfort measures. The rest of the family is upset and does not believe the dx of lymphoma.    Daughter denies any history of heart failure or kidney disease.  BUN/Cr (17 [facility])= 39 / 1.82. (10 Apr 2017 12:26)     Vital Signs Last 24 Hrs  T(C): 36.7, Max: 37.4 (04-12 @ 20:52)  T(F): 98.1, Max: 99.4 (04-12 @ 20:52)  HR: 90 (79 - 90)  BP: 94/48 (94/48 - 104/54)  BP(mean): --  RR: 18 (17 - 18)  SpO2: 83% (83% - 96%)    I&O's Detail    I & Os for current day (as of 2017 08:47)  =============================================  IN:    sodium chloride 0.45%.: 675 ml    Solution: 250 ml    sodium chloride 0.9%: 210 ml    Solution: 50 ml    Total IN: 1185 ml  ---------------------------------------------  OUT:    Total OUT: 0 ml  ---------------------------------------------  Total NET: 1185 ml      REVIEW OF SYSTEMS: unable to obtain due to confusion/agitation       PHYSICAL EXAM:  GENERAL: lethargic and agitated, taking off clothes  HEAD:  Atraumatic, Normocephalic  EYES: EOMI, PERRLA, conjunctiva and sclera clear  ENMT: intact   NECK: Supple,   NERVOUS SYSTEM:  Alert & Oriented X0,  CHEST/LUNG: Clear to percussion bilaterally; No rales, rhonchi, wheezing,   HEART: Regular rate and rhythm; No murmurs, rubs, or gallops  GI : Soft,  obese , Nontender, Nondistended; Bowel sounds present  EXTREMITIES:  2+ Peripheral Pulses, No clubbing, cyanosis, or edema  LYMPH: No lymphadenopathy noted  SKIN: blanchable red iris under right heel     MEDICATIONS  (STANDING):  heparin  Injectable 5000Unit(s) SubCutaneous every 12 hours  predniSONE   Tablet 5milliGRAM(s) Oral daily  atorvastatin 10milliGRAM(s) Oral at bedtime  amLODIPine   Tablet 5milliGRAM(s) Oral daily  pantoprazole    Tablet 40milliGRAM(s) Oral before breakfast  allopurinol 100milliGRAM(s) Oral daily  senna 2Tablet(s) Oral at bedtime  docusate sodium 100milliGRAM(s) Oral two times a day  sodium chloride 0.45%. 1000milliLiter(s) IV Continuous <Continuous>  cefTRIAXone   IVPB  IV Intermittent   azithromycin  IVPB  IV Intermittent   cefTRIAXone   IVPB 1Gram(s) IV Intermittent every 24 hours  azithromycin  IVPB 500milliGRAM(s) IV Intermittent every 24 hours                            12.0   8.0   )-----------( 86       ( 2017 08:44 )             36.8     04-13    140  |  112<H>  |  53<H>  ----------------------------<  73  4.9   |  17<L>  |  2.90<H>    Ca    8.0<L>      2017 08:44  Phos  5.7     04-13  Mg     2.0           Color: Yellow / Appearance: Clear / S.020 / pH: x  Gluc: x / Ketone: Trace  / Bili: Small / Urobili: 1   Blood: x / Protein: 75 mg/dL / Nitrite: Negative   Leuk Esterase: Trace / RBC: 0-2 /HPF / WBC 0-2   Sq Epi: x / Non Sq Epi: Occasional / Bacteria: x      RADIOLOGY & ADDITIONAL TESTS:    Imaging Personally Reviewed: YES CT SCAN ABD RENAL CYST present     B/L Renal US:Multiple bilateral renal cysts.  The indeterminate lesions described on CT the same day not visualized on   this limited study. As such MRI is suggested    CT ABD/PELVIS: No hydroureteronephrosis or urolithiasis.  Bilateral indeterminate renal cortical lesions as described. Correlate   with MR. Nonspecific mesenteric panniculitis.  Bibasilar pulmonary parenchymal nodules. Mild mediastinal adenopathy.   Correlate with chest CT.        CT CHEST - Numerous pathologically enlarged lymph nodes within the left axilla,   mediastinum, and edwar, worrisome for a neoplastic process such as   lymphoma. The large lymph node in the left axilla measures approximately   2.3 x 1.8 cm.    Tiny bilateral pleural effusions are present. Evaluation of the lung   parenchyma is limited secondary to breathing motion artifact. There is   suggestion of several scattered 4 to 5 mm nodules in the bilateral lungs.     Splenomegaly.    1.8 x 1.6 cm hypoattenuating nodule within the right lobe of the thyroid,   for which ultrasound of the thyroid may be obtained for further   evaluation.      Advance Directives: DNR Patient is a 89y old  Female who presents with a chief complaint of weakness, pt unable to walk      HPI: c/c weakness   89F PMHx HLD, HTN, GERD, with recent discharge from [facility] for dysphagia and lymphoma, c/o disorientation 3 days ago, patient complaining as she could not breath.  There CT head and throat resulted with suspicion of lymphoma recommending hospice.  After discharge, patient was exhausted and disorientation persisted 2 days ago. Pt has decreased PO intake with dehydrations so daughter brought [facility]. According to pts family this is not how she was just a few days ago. Family states she is usually walking around and talking. Pt is still confused and trying to take off clothing off overnight as the previous night. Pt family is at bedside. Daughter that is the health care proxy is very realistic and understands the likely lymphoma dx. She does not want anything invasive such as a biopsy and wishes for comfort measures. The rest of the family is upset and does not believe the dx of lymphoma.    Daughter denies any history of heart failure or kidney disease.  BUN/Cr (17 [facility])= 39 / 1.82. (10 Apr 2017 12:26)     Vital Signs Last 24 Hrs  T(C): 36.7, Max: 37.4 (04-12 @ 20:52)  T(F): 98.1, Max: 99.4 (04-12 @ 20:52)  HR: 90 (79 - 90)  BP: 94/48 (94/48 - 104/54)  BP(mean): --  RR: 18 (17 - 18)  SpO2: 83% (83% - 96%)    I&O's Detail    I & Os for current day (as of 2017 08:47)  =============================================  IN:    sodium chloride 0.45%.: 675 ml    Solution: 250 ml    sodium chloride 0.9%: 210 ml    Solution: 50 ml    Total IN: 1185 ml  ---------------------------------------------  OUT:    Total OUT: 0 ml  ---------------------------------------------  Total NET: 1185 ml      REVIEW OF SYSTEMS: unable to obtain due to confusion/agitation       PHYSICAL EXAM:  GENERAL: lethargic and agitated, taking off clothes  HEAD:  Atraumatic, Normocephalic  EYES: EOMI, PERRLA, conjunctiva and sclera clear  ENMT: intact   NECK: Supple,   NERVOUS SYSTEM:  Alert & Oriented X0,  CHEST/LUNG: decreased bilaterally; No rales, rhonchi, wheezing,   HEART: Regular rate and rhythm; No murmurs, rubs, or gallops  GI : Soft,  obese , Nontender, Nondistended; Bowel sounds present  EXTREMITIES:  2+ Peripheral Pulses, No clubbing, cyanosis, or edema  LYMPH: No lymphadenopathy noted  SKIN: blanchable red iris under right heel     MEDICATIONS  (STANDING):  heparin  Injectable 5000Unit(s) SubCutaneous every 12 hours  predniSONE   Tablet 5milliGRAM(s) Oral daily  atorvastatin 10milliGRAM(s) Oral at bedtime  amLODIPine   Tablet 5milliGRAM(s) Oral daily  pantoprazole    Tablet 40milliGRAM(s) Oral before breakfast  allopurinol 100milliGRAM(s) Oral daily  senna 2Tablet(s) Oral at bedtime  docusate sodium 100milliGRAM(s) Oral two times a day  sodium chloride 0.45%. 1000milliLiter(s) IV Continuous <Continuous>  cefTRIAXone   IVPB  IV Intermittent   azithromycin  IVPB  IV Intermittent   cefTRIAXone   IVPB 1Gram(s) IV Intermittent every 24 hours  azithromycin  IVPB 500milliGRAM(s) IV Intermittent every 24 hours                            12.0   8.0   )-----------( 86       ( 2017 08:44 )             36.8     04-13    140  |  112<H>  |  53<H>  ----------------------------<  73  4.9   |  17<L>  |  2.90<H>    Ca    8.0<L>      2017 08:44  Phos  5.7     04-13  Mg     2.0           Color: Yellow / Appearance: Clear / S.020 / pH: x  Gluc: x / Ketone: Trace  / Bili: Small / Urobili: 1   Blood: x / Protein: 75 mg/dL / Nitrite: Negative   Leuk Esterase: Trace / RBC: 0-2 /HPF / WBC 0-2   Sq Epi: x / Non Sq Epi: Occasional / Bacteria: x      RADIOLOGY & ADDITIONAL TESTS:    Imaging Personally Reviewed: YES CT SCAN ABD RENAL CYST present     B/L Renal US:Multiple bilateral renal cysts.  The indeterminate lesions described on CT the same day not visualized on   this limited study. As such MRI is suggested    CT ABD/PELVIS: No hydroureteronephrosis or urolithiasis.  Bilateral indeterminate renal cortical lesions as described. Correlate   with MR. Nonspecific mesenteric panniculitis.  Bibasilar pulmonary parenchymal nodules. Mild mediastinal adenopathy.   Correlate with chest CT.        CT CHEST - Numerous pathologically enlarged lymph nodes within the left axilla,   mediastinum, and edwar, worrisome for a neoplastic process such as   lymphoma. The large lymph node in the left axilla measures approximately   2.3 x 1.8 cm.    Tiny bilateral pleural effusions are present. Evaluation of the lung   parenchyma is limited secondary to breathing motion artifact. There is   suggestion of several scattered 4 to 5 mm nodules in the bilateral lungs.     Splenomegaly.    1.8 x 1.6 cm hypoattenuating nodule within the right lobe of the thyroid,   for which ultrasound of the thyroid may be obtained for further   evaluation.      Advance Directives: DNR Patient is a 89y old  Female who presents with a chief complaint of weakness, pt unable to walk      HPI: c/c weakness   89F PMHx HLD, HTN, GERD, with recent discharge from [facility] for dysphagia and lymphoma, c/o disorientation 3 days ago, patient complaining as she could not breath.  There CT head and throat resulted with suspicion of lymphoma recommending hospice.  After discharge, patient was exhausted and disorientation persisted 2 days ago. Pt has decreased PO intake with dehydration so daughter brought [facility] to [facility]. According to pts family this is not how she was just a few days ago. Family states she is usually walking around and talking. Pt is still confused and trying to take off clothing off overnight as the previous night. Pt family is at bedside. Daughter that is the health care proxy is very realistic and understands the likely lymphoma dx. She does not want anything invasive such as a biopsy and wishes for comfort measures. The rest of the family is upset and does not believe the dx of lymphoma.    Daughter denies any history of heart failure or kidney disease.  BUN/Cr (17 [facility])= 39 / 1.82. (10 Apr 2017 12:26)       Vital Signs Last 24 Hrs  T(C): 36.7, Max: 37.4 (04-12 @ 20:52)  T(F): 98.1, Max: 99.4 (04-12 @ 20:52)  HR: 90 (79 - 90)  BP: 94/48 (94/48 - 104/54)  BP(mean): --  RR: 18 (17 - 18)  SpO2: 83% (83% - 96%)    I&O's Detail    I & Os for current day (as of 2017 08:47)  =============================================  IN:    sodium chloride 0.45%.: 675 ml    Solution: 250 ml    sodium chloride 0.9%: 210 ml    Solution: 50 ml    Total IN: 1185 ml  ---------------------------------------------  OUT:    Total OUT: 0 ml  ---------------------------------------------  Total NET: 1185 ml      REVIEW OF SYSTEMS: unable to obtain due to confusion/agitation       PHYSICAL EXAM:  GENERAL: lethargic and agitated, taking off clothes  HEAD:  Atraumatic, Normocephalic  EYES: EOMI, PERRLA, conjunctiva and sclera clear  ENMT: intact   NECK: Supple,   NERVOUS SYSTEM:  Alert & Oriented X0,  CHEST/LUNG: decreased bilaterally; No rales, rhonchi, wheezing,   HEART: Regular rate and rhythm; No murmurs, rubs, or gallops  GI : Soft,  obese , Nontender, Nondistended; Bowel sounds present  EXTREMITIES:  2+ Peripheral Pulses, No clubbing, cyanosis, or edema  LYMPH: No lymphadenopathy noted  SKIN: blanchable red iris under right heel     MEDICATIONS  (STANDING):  heparin  Injectable 5000Unit(s) SubCutaneous every 12 hours  predniSONE   Tablet 5milliGRAM(s) Oral daily  atorvastatin 10milliGRAM(s) Oral at bedtime  amLODIPine   Tablet 5milliGRAM(s) Oral daily  pantoprazole    Tablet 40milliGRAM(s) Oral before breakfast  allopurinol 100milliGRAM(s) Oral daily  senna 2Tablet(s) Oral at bedtime  docusate sodium 100milliGRAM(s) Oral two times a day  sodium chloride 0.45%. 1000milliLiter(s) IV Continuous <Continuous>  cefTRIAXone   IVPB  IV Intermittent   azithromycin  IVPB  IV Intermittent   cefTRIAXone   IVPB 1Gram(s) IV Intermittent every 24 hours  azithromycin  IVPB 500milliGRAM(s) IV Intermittent every 24 hours                            12.0   8.0   )-----------( 86       ( 2017 08:44 )             36.8     04-13    140  |  112<H>  |  53<H>  ----------------------------<  73  4.9   |  17<L>  |  2.90<H>    Ca    8.0<L>      2017 08:44  Phos  5.7     04-13  Mg     2.0     -13      Color: Yellow / Appearance: Clear / S.020 / pH: x  Gluc: x / Ketone: Trace  / Bili: Small / Urobili: 1   Blood: x / Protein: 75 mg/dL / Nitrite: Negative   Leuk Esterase: Trace / RBC: 0-2 /HPF / WBC 0-2   Sq Epi: x / Non Sq Epi: Occasional / Bacteria: x      RADIOLOGY & ADDITIONAL TESTS:    Imaging Personally Reviewed: YES CT SCAN ABD RENAL CYST present     B/L Renal US:Multiple bilateral renal cysts.  The indeterminate lesions described on CT the same day not visualized on   this limited study. As such MRI is suggested    CT ABD/PELVIS: No hydroureteronephrosis or urolithiasis.  Bilateral indeterminate renal cortical lesions as described. Correlate   with MR. Nonspecific mesenteric panniculitis.  Bibasilar pulmonary parenchymal nodules. Mild mediastinal adenopathy.   Correlate with chest CT.        CT CHEST - Numerous pathologically enlarged lymph nodes within the left axilla,   mediastinum, and edwar, worrisome for a neoplastic process such as   lymphoma. The large lymph node in the left axilla measures approximately   2.3 x 1.8 cm.    Tiny bilateral pleural effusions are present. Evaluation of the lung   parenchyma is limited secondary to breathing motion artifact. There is   suggestion of several scattered 4 to 5 mm nodules in the bilateral lungs.     Splenomegaly.    1.8 x 1.6 cm hypoattenuating nodule within the right lobe of the thyroid,   for which ultrasound of the thyroid may be obtained for further   evaluation.      Advance Directives: DNR

## 2017-04-13 NOTE — PROGRESS NOTE ADULT - PROBLEM SELECTOR PLAN 2
- possible PNA , CT [place] showed - Bibasilar pulmonary parenchymal nodules. Mild mediastinal adenopathy. Will f/u with Chest CT  - bands 14%, procalcitonin .5  - Started on Rocephin and azithromycin  - started on IVF 75 - CT Chest - Numerous pathologically enlarged lymph nodes and bilateral pleural effusions are present.     - possible PNA , CT stone hunt showed - Bibasilar pulmonary parenchymal nodules. Mild mediastinal adenopathy.  - bands 14%, procalcitonin .5  - Started on Rocephin and azithromycin  - started on IVF 75 - possible PNA  - CT Chest - Numerous pathologically enlarged lymph nodes and bilateral pleural effusions are present.     -  CT [place] showed - Bibasilar pulmonary parenchymal nodules. Mild mediastinal adenopathy.  - bands 14%, procalcitonin .5, will f/u tomorrows labs  - continue on Rocephin and azithromycin, based on tomorrows labs and exam might d/c abx

## 2017-04-13 NOTE — PROGRESS NOTE ADULT - PROBLEM SELECTOR PLAN 3
- lactate wnl  - cont hydration with IVF and encourage increased PO intake  - Speech and swallow eval - recc dysphagia 2 diet - pt confused and taking off clothes  - started on risperidone, will monitor mental status   - possibly 2/2 uremia   - continue IVF - pt confused and taking off clothes  - started on small dose of risperidone for agitation, will monitor mental status   - possibly 2/2 uremia   - continue IVF

## 2017-04-13 NOTE — DISCHARGE NOTE ADULT - PATIENT PORTAL LINK FT
“You can access the [facility] Patient Portal, offered by [facility], by registering with the following website: [email or web address]”

## 2017-04-13 NOTE — PROGRESS NOTE ADULT - PROBLEM SELECTOR PLAN 6
- resolved after repletion - likely 2/2 CALLIE - controlled  - continue low salt diet  - continue with Amlodipine

## 2017-04-13 NOTE — DISCHARGE NOTE ADULT - MEDICATION SUMMARY - MEDICATIONS TO TAKE
I will START or STAY ON the medications listed below when I get home from the hospital:    morphine  --     -- Indication: For COmfort care    senna oral tablet  -- 2 tab(s) by mouth once a day (at bedtime)  -- Indication: For COmfort care    docusate sodium 100 mg oral capsule  -- 1 cap(s) by mouth 2 times a day  -- Indication: For COmfort care    atropine 1% ophthalmic solution  --  to each affected eye   -- Indication: For COmfort care    ocular lubricant ophthalmic solution  -- 2 drop(s) to each affected eye every 5 minutes, As needed, Dry Eyes  -- Indication: For COmfort care

## 2017-04-13 NOTE — DISCHARGE NOTE ADULT - HOSPITAL COURSE
89F PMHx HLD, HTN, GERD, with recent discharge from [facility] for dysphagia and lymphoma, c/o disorientation 3 days ago, patient complaining as she could not breath.  There CT head and throat resulted with suspicion of lymphoma recommending hospice.  After discharge, patient was exhausted and disorientation persisted 2 days ago. Pt had decreased PO intake with dehydration so daughter brought her to [facility]. According to pts family this is not how she was just a few days ago. Family stated she is usually walking around and talking. Daughter denies any history of heart failure or kidney disease.  BUN/Cr (4/7/17 [facility])= 39 / 1.82. (10 Apr 2017 12:26)     Renal insufficiency likely CALLIE  on CKD3. Pt seen by yuly Holt. IVF were given.. Bladder scan showed no residual, r/o retention. Electrolytes abnormal from CALLIE. B/L Renal US showed multiple bilateral renal cysts. CT ABD/PELVIS: No hydroureteronephrosis or urolithiasis. Bilateral indeterminate renal cortical lesions as described. Nonspecific mesenteric panniculitis. Bibasilar pulmonary parenchymal nodules. Mild mediastinal adenopathy. CT CHEST - Numerous pathologically enlarged lymph nodes with Tiny bilateral pleural effusions are present. Pt was severely dehydrated and was encouraged increased PO intake and to continue IVF. Speech and swallow eval suggested dysphagia 2 diet.  Pt treated for possible PNA since XR which showed - Bilateral lower lobe atelectasis or infiltrate and due to bands were 14%, procalcitonin .5 with AMS. Pt was started on Rocephin and azithromycin. HTN was controlled with low salt diet and Amlodipine.     Pt likely has lymphoma dx based on CT scans. Heme/onc consulted and agreed, believe prognosis is poor, and they discussed at length with daughter, the health care proxy, she does not want aggressive measures such as a biopsy to confirm dx.          More than 60 min was spent on this note. 89F PMHx HLD, HTN, GERD, with recent discharge from [facility] for dysphagia and lymphoma, c/o disorientation 3 days ago, patient complaining as she could not breath.  There CT head and throat resulted with suspicion of lymphoma recommending hospice.  After discharge, patient was exhausted and disorientation persisted 2 days ago. Pt had decreased PO intake with dehydration so daughter brought her to [facility]. According to pts family this is not how she was just a few days ago. Family stated she is usually walking around and talking. Daughter denies any history of heart failure or kidney disease.  BUN/Cr (4/7/17 [facility])= 39 / 1.82. (10 Apr 2017 12:26)     Renal insufficiency likely CALLIE  on CKD3. Pt seen by yuly Holt. IVF were given. Bladder scan showed no residual, r/o retention. Electrolytes abnormal from CALLIE. B/L Renal US showed multiple bilateral renal cysts. CT ABD/PELVIS: No hydroureteronephrosis or urolithiasis. Bilateral indeterminate renal cortical lesions as described. Nonspecific mesenteric panniculitis. Bibasilar pulmonary parenchymal nodules. Mild mediastinal adenopathy. CT CHEST - Numerous pathologically enlarged lymph nodes with Tiny bilateral pleural effusions are present. Pt was severely dehydrated and was encouraged increased PO intake and to continue IVF. Speech and swallow eval suggested dysphagia 2 diet.  Pt treated for possible PNA since XR which showed - Bilateral lower lobe atelectasis or infiltrate and due to bands were 14%, procalcitonin .5 with AMS. Pt was started on Rocephin and azithromycin. HTN was controlled with low salt diet and Amlodipine. Pt likely has lymphoma dx based on CT scans. Heme/onc consulted and agreed, believe prognosis is poor, and they discussed at length with daughter, the health care proxy, she does not want aggressive measures such as a biopsy to confirm dx. Pt stayed confused with an altered mental status. Risperidone was started and moprhine PRN for pain control. Family has decided on comfort care measures and to d/c to hospice inn.           More than 60 min was spent on this note.

## 2017-04-13 NOTE — PROGRESS NOTE ADULT - PROBLEM SELECTOR PLAN 5
- stable  - continue with duoneb treatments PRN  - continue with Ocean spray for PRN nasal congestion - lactate wnl  - cont hydration with IVF and encourage increased PO intake  - Speech and swallow eval - recc dysphagia 2 diet

## 2017-04-13 NOTE — PROGRESS NOTE ADULT - PROBLEM SELECTOR PLAN 1
- CALLIE  on CKD3?    - GFR 13, BUN 49 from 45 yesterday, Cr 2.8 from 2.7 yesterday, Cl 114, CO2 18, Ca 7.8, Na 143, K 4.9, f/u electrolyte in AM  - Pt seen by Dr. Velez, nephro - Improving renal function. Changed IVF to half NS. Bladder scan to r/o retention - will f/u - CALLIE  on CKD3?    - GFR 13, BUN 53 from 49 yesterday, Cr 2.9 from 2.8 yesterday, Cl 112, CO2 17, Ca 8.0, f/u electrolyte in AM  - Pt seen by Dr. Velez, nephro - Improving renal function. Changed IVF to half NS. Bladder scan to r/o retention - will f/u - CALLIE  on CKD3?    - GFR 13, BUN 53 from 49 yesterday, Cr 2.9 from 2.8 yesterday, Cl 112, CO2 17, Ca 8.0, f/u electrolyte in AM  - Pt seen by Dr. Velez, nephro - stable renal function. Continue IVF. Bladder scan showed 0 today r/o retention

## 2017-04-13 NOTE — PROGRESS NOTE ADULT - PROBLEM SELECTOR PLAN 7
- CT at [facility] showed - multiple lymphadenopathies, B/L renal indeterminate masses, B/L pulm nodules.  - Heme/onc consulted - findings highly suspicious for met malignancy, ?lymphoma, ?solid tumor such as kidney and other sources. Prognosis poor and they discussed at length with daughter who does not want aggressive measures - stable  - continue with duoneb treatments PRN  - continue with Ocean spray for PRN nasal congestion

## 2017-04-13 NOTE — PROGRESS NOTE ADULT - ATTENDING COMMENTS
Patient remains encephalopathic, likely uremic. Doubt infection, will likely d/c abx tomorrow if no signs of infection emerge. Daughter wants to keep comfortable and defer further workup at this point. Inpatient hospice arrangements pending. Starting low dose Risperidone to control symptoms of agitation and delirium.

## 2017-04-13 NOTE — PROGRESS NOTE ADULT - PROBLEM SELECTOR PLAN 4
- controlled  - continue low salt diet  - continue with Amlodipine - CT Chest -  Numerous pathologically enlarged lymph nodes within the left axilla,   mediastinum, and edwar, worrisome for a neoplastic process such as   lymphoma.  - Heme/onc consulted - findings highly suspicious for met malignancy, ?lymphoma, ?solid tumor such as kidney and other sources. Prognosis poor and they discussed at length with daughter who does not want aggressive measures

## 2017-04-13 NOTE — DISCHARGE NOTE ADULT - MEDICATION SUMMARY - MEDICATIONS TO STOP TAKING
I will STOP taking the medications listed below when I get home from the hospital:    Vitamin D2  --  by mouth    allopurinol  -- 2.5 milligram(s) by mouth once a day    amLODIPine  -- 5 milligram(s) by mouth once a day    atorvastatin  -- 10 milligram(s) by mouth once a day    Lasix  -- 40 milligram(s) by mouth once a day    omeprazole  -- 40 milligram(s) by mouth once a day    prednisoLONE  -- 5 milligram(s) by mouth once a day    valsartan  -- 320 milligram(s) by mouth once a day    albuterol 2.5 mg/3 mL (0.083%) inhalation solution  -- 3 milliliter(s) inhaled every 6 hours, As Needed    allopurinol 100 mg oral tablet  -- 1 tab(s) by mouth once a day

## 2017-04-13 NOTE — PROGRESS NOTE ADULT - PROBLEM SELECTOR PLAN 1
Stable reanl function. Continue gentle IV hydration.   Encourage PO intake as tolerated. Will follow lytes and renal function trend.   Overall prognosis poor. Supportive care. Palliative care appropriate.   D/w pt's daughter at bedside. Supportive care.

## 2017-04-13 NOTE — DISCHARGE NOTE ADULT - CARE PLAN
Principal Discharge DX:	Acute kidney injury  Secondary Diagnosis:	Lymphoma  Secondary Diagnosis:	Altered mental status  Secondary Diagnosis:	Dehydration  Secondary Diagnosis:	Dysphagia  Secondary Diagnosis:	HTN (hypertension)  Secondary Diagnosis:	HLD (hyperlipidemia) Principal Discharge DX:	Acute kidney injury  Goal:	stable  Instructions for follow-up, activity and diet:	Comfort care measure  follow reccs of hospice inn  Secondary Diagnosis:	Lymphoma  Instructions for follow-up, activity and diet:	Comfort care measure  follow reccs of [facility] inn  Secondary Diagnosis:	Altered mental status  Instructions for follow-up, activity and diet:	Comfort care measure  follow reccs of [facility] inn  Secondary Diagnosis:	Dehydration  Instructions for follow-up, activity and diet:	Comfort care measure  follow reccs of [facility]  Secondary Diagnosis:	Dysphagia  Instructions for follow-up, activity and diet:	Comfort care measure  follow reccs of [facility]  Secondary Diagnosis:	HTN (hypertension)  Instructions for follow-up, activity and diet:	Comfort care measure  follow reccs of [facility]  Secondary Diagnosis:	HLD (hyperlipidemia)  Instructions for follow-up, activity and diet:	Comfort care measure  follow reccs of [facility] inn Principal Discharge DX:	Acute kidney injury  Goal:	stable  Instructions for follow-up, activity and diet:	Comfort care measure  follow reccs of hospice inn  Secondary Diagnosis:	Lymphoma  Instructions for follow-up, activity and diet:	Comfort care measure  follow reccs of \[facility]"" inn  Secondary Diagnosis:	Altered mental status  Instructions for follow-up, activity and diet:	Comfort care measure  follow reccs of \[facility]\"" inn  Secondary Diagnosis:	Dehydration  Instructions for follow-up, activity and diet:	Comfort care measure  follow reccs of hospitals  Secondary Diagnosis:	Dysphagia  Instructions for follow-up, activity and diet:	Comfort care measure  follow reccs of hospitals  Secondary Diagnosis:	HTN (hypertension)  Instructions for follow-up, activity and diet:	Comfort care measure  follow reccs of hospitals  Secondary Diagnosis:	HLD (hyperlipidemia)  Instructions for follow-up, activity and diet:	Comfort care measure  follow reccs of \[facility]\"" inn

## 2017-04-13 NOTE — PROGRESS NOTE ADULT - ASSESSMENT
89F pmhx hld, htn, gerd, recent discharge from [facility] for dysphagia and suspected lymphoma, c/o Disorientation 3 days ago, now admitted with altered mental status and CALLIE.

## 2017-04-13 NOTE — PROGRESS NOTE ADULT - SUBJECTIVE AND OBJECTIVE BOX
Patient seen in follow up for CALLIE. Poor PO intake. Pt remains agitated / confused overnight as per family.     PAST MEDICAL HISTORY:  Gout  Dysphagia  COPD (chronic obstructive pulmonary disease)  Acute kidney injury  GERD (gastroesophageal reflux disease)  CKD (chronic kidney disease)  Hyperlipidemia  HTN (hypertension)    MEDICATIONS  (STANDING):  heparin  Injectable 5000Unit(s) SubCutaneous every 12 hours  predniSONE   Tablet 5milliGRAM(s) Oral daily  atorvastatin 10milliGRAM(s) Oral at bedtime  amLODIPine   Tablet 5milliGRAM(s) Oral daily  pantoprazole    Tablet 40milliGRAM(s) Oral before breakfast  allopurinol 100milliGRAM(s) Oral daily  senna 2Tablet(s) Oral at bedtime  docusate sodium 100milliGRAM(s) Oral two times a day  sodium chloride 0.45%. 1000milliLiter(s) IV Continuous <Continuous>  cefTRIAXone   IVPB  IV Intermittent   azithromycin  IVPB  IV Intermittent   cefTRIAXone   IVPB 1Gram(s) IV Intermittent every 24 hours  azithromycin  IVPB 500milliGRAM(s) IV Intermittent every 24 hours    MEDICATIONS  (PRN):  ALBUTerol/ipratropium for Nebulization 3milliLiter(s) Nebulizer every 6 hours PRN Shortness of Breath and/or Wheezing  sodium chloride 0.65% Nasal 1Spray(s) Both Nostrils once PRN Nasal Congestion  haloperidol     Tablet 1milliGRAM(s) Oral once PRN psychosis, agitation    T(C): 36.7, Max: 37.4 (04-12 @ 20:52)  HR: 90 (78 - 90)  BP: 94/48 (94/48 - 104/54)  RR: 18 (16 - 18)  SpO2: 83% (83% - 96%)  Wt(kg): --  I&O's Detail  I & Os for 24h ending 13 Apr 2017 07:00  =============================================  IN:    sodium chloride 0.45%.: 675 ml    Solution: 250 ml    sodium chloride 0.9%: 210 ml    Solution: 50 ml    Total IN: 1185 ml  ---------------------------------------------  OUT:    Total OUT: 0 ml  ---------------------------------------------  Total NET: 1185 ml    I & Os for current day (as of 13 Apr 2017 09:31)  =============================================  IN:    sodium chloride 0.45%.: 225 ml    Total IN: 225 ml  ---------------------------------------------  OUT:    Total OUT: 0 ml  ---------------------------------------------  Total NET: 225 ml      PHYSICAL EXAM:  General: NAD  Respiratory: b/l air entry  Cardiovascular: S1 S2  Gastrointestinal: soft, obese  Extremities:  edema    CBC Full  -  ( 12 Apr 2017 08:40 )  WBC Count : 8.4 K/uL  Hemoglobin : 11.2 g/dL  Hematocrit : 34.7 %  Platelet Count - Automated : 90 K/uL  Mean Cell Volume : 94.9 fl  Mean Cell Hemoglobin : 30.8 pg  Mean Cell Hemoglobin Concentration : 32.4 gm/dL  Auto Neutrophil # : x  Auto Lymphocyte # : x  Auto Monocyte # : x  Auto Eosinophil # : x  Auto Basophil # : x  Auto Neutrophil % : 72.0 %  Auto Lymphocyte % : 8.0 %  Auto Monocyte % : 4.0 %  Auto Eosinophil % : x  Auto Basophil % : x    04-13    140  |  112<H>  |  53<H>  ----------------------------<  73  4.9   |  17<L>  |  2.90<H>    Ca    8.0<L>      13 Apr 2017 08:44  Mg     2.0     04-12          Sodium, Serum: 140 (04-13 @ 08:44)  Sodium, Serum: 143 (04-12 @ 08:40)  Sodium, Serum: 141 (04-11 @ 07:57)  Sodium, Serum: 131 (04-10 @ 08:14)    Creatinine, Serum: 2.90 (04-13 @ 08:44)  Creatinine, Serum: 2.80 (04-12 @ 08:40)  Creatinine, Serum: 2.70 (04-11 @ 07:57)  Creatinine, Serum: 3.10 (04-10 @ 08:14)    Potassium, Serum: 4.9 (04-13 @ 08:44)  Potassium, Serum: 4.9 (04-12 @ 08:40)  Potassium, Serum: 4.5 (04-11 @ 07:57)  Potassium, Serum: 4.8 (04-10 @ 08:14)    Hemoglobin: 11.2 (04-12 @ 08:40)  Hemoglobin: 11.1 (04-11 @ 07:57)  Hemoglobin: 12.8 (04-10 @ 08:14)

## 2017-04-14 DIAGNOSIS — C85.90 NON-HODGKIN LYMPHOMA, UNSPECIFIED, UNSPECIFIED SITE: ICD-10-CM

## 2017-04-14 LAB
ANION GAP SERPL CALC-SCNC: 11 MMOL/L — SIGNIFICANT CHANGE UP (ref 5–17)
BUN SERPL-MCNC: 53 MG/DL — HIGH (ref 7–23)
CALCIUM SERPL-MCNC: 8.2 MG/DL — LOW (ref 8.5–10.1)
CHLORIDE SERPL-SCNC: 114 MMOL/L — HIGH (ref 96–108)
CO2 SERPL-SCNC: 15 MMOL/L — LOW (ref 22–31)
CREAT SERPL-MCNC: 2.7 MG/DL — HIGH (ref 0.5–1.3)
GLUCOSE SERPL-MCNC: 76 MG/DL — SIGNIFICANT CHANGE UP (ref 70–99)
HCT VFR BLD CALC: 35.1 % — SIGNIFICANT CHANGE UP (ref 34.5–45)
HGB BLD-MCNC: 11.4 G/DL — LOW (ref 11.5–15.5)
MAGNESIUM SERPL-MCNC: 2.1 MG/DL — SIGNIFICANT CHANGE UP (ref 1.8–2.4)
MCHC RBC-ENTMCNC: 30.5 PG — SIGNIFICANT CHANGE UP (ref 27–34)
MCHC RBC-ENTMCNC: 32.3 GM/DL — SIGNIFICANT CHANGE UP (ref 32–36)
MCV RBC AUTO: 94.4 FL — SIGNIFICANT CHANGE UP (ref 80–100)
PHOSPHATE SERPL-MCNC: 6.2 MG/DL — HIGH (ref 2.5–4.5)
PLATELET # BLD AUTO: 68 K/UL — LOW (ref 150–400)
POTASSIUM SERPL-MCNC: 5.3 MMOL/L — SIGNIFICANT CHANGE UP (ref 3.5–5.3)
POTASSIUM SERPL-SCNC: 5.3 MMOL/L — SIGNIFICANT CHANGE UP (ref 3.5–5.3)
PROCALCITONIN SERPL-MCNC: 0.45 NG/ML — HIGH (ref 0–0.05)
RBC # BLD: 3.72 M/UL — LOW (ref 3.8–5.2)
RBC # FLD: 14.3 % — SIGNIFICANT CHANGE UP (ref 10.3–14.5)
SODIUM SERPL-SCNC: 140 MMOL/L — SIGNIFICANT CHANGE UP (ref 135–145)
WBC # BLD: 8.4 K/UL — SIGNIFICANT CHANGE UP (ref 3.8–10.5)
WBC # FLD AUTO: 8.4 K/UL — SIGNIFICANT CHANGE UP (ref 3.8–10.5)

## 2017-04-14 PROCEDURE — 99233 SBSQ HOSP IP/OBS HIGH 50: CPT | Mod: GC

## 2017-04-14 RX ORDER — DOCUSATE SODIUM 100 MG
1 CAPSULE ORAL
Qty: 0 | Refills: 0 | DISCHARGE
Start: 2017-04-14

## 2017-04-14 RX ORDER — MORPHINE SULFATE 50 MG/1
0 CAPSULE, EXTENDED RELEASE ORAL
Qty: 0 | Refills: 0 | DISCHARGE
Start: 2017-04-14

## 2017-04-14 RX ORDER — SENNA PLUS 8.6 MG/1
2 TABLET ORAL
Qty: 0 | Refills: 0 | DISCHARGE
Start: 2017-04-14

## 2017-04-14 RX ORDER — ATROPINE SULFATE 1 %
1 DROPS OPHTHALMIC (EYE) EVERY 6 HOURS
Refills: 0 | Status: DISCONTINUED | OUTPATIENT
Start: 2017-04-14 | End: 2017-04-15

## 2017-04-14 RX ORDER — ATROPINE SULFATE 1 %
0 DROPS OPHTHALMIC (EYE)
Qty: 0 | Refills: 0 | DISCHARGE
Start: 2017-04-14

## 2017-04-14 RX ADMIN — HEPARIN SODIUM 5000 UNIT(S): 5000 INJECTION INTRAVENOUS; SUBCUTANEOUS at 10:10

## 2017-04-14 RX ADMIN — PANTOPRAZOLE SODIUM 40 MILLIGRAM(S): 20 TABLET, DELAYED RELEASE ORAL at 06:26

## 2017-04-14 RX ADMIN — NYSTATIN CREAM 1 APPLICATION(S): 100000 CREAM TOPICAL at 17:18

## 2017-04-14 RX ADMIN — MORPHINE SULFATE 2 MILLIGRAM(S): 50 CAPSULE, EXTENDED RELEASE ORAL at 20:00

## 2017-04-14 RX ADMIN — MORPHINE SULFATE 2 MILLIGRAM(S): 50 CAPSULE, EXTENDED RELEASE ORAL at 00:23

## 2017-04-14 RX ADMIN — Medication 3 MILLILITER(S): at 06:47

## 2017-04-14 RX ADMIN — MORPHINE SULFATE 2 MILLIGRAM(S): 50 CAPSULE, EXTENDED RELEASE ORAL at 22:00

## 2017-04-14 RX ADMIN — MORPHINE SULFATE 2 MILLIGRAM(S): 50 CAPSULE, EXTENDED RELEASE ORAL at 12:07

## 2017-04-14 RX ADMIN — LIDOCAINE 2 MILLILITER(S): 4 CREAM TOPICAL at 06:24

## 2017-04-14 RX ADMIN — MORPHINE SULFATE 2 MILLIGRAM(S): 50 CAPSULE, EXTENDED RELEASE ORAL at 14:32

## 2017-04-14 RX ADMIN — Medication 100 MILLIGRAM(S): at 06:26

## 2017-04-14 RX ADMIN — MORPHINE SULFATE 2 MILLIGRAM(S): 50 CAPSULE, EXTENDED RELEASE ORAL at 21:48

## 2017-04-14 RX ADMIN — MORPHINE SULFATE 2 MILLIGRAM(S): 50 CAPSULE, EXTENDED RELEASE ORAL at 19:30

## 2017-04-14 RX ADMIN — NYSTATIN CREAM 1 APPLICATION(S): 100000 CREAM TOPICAL at 06:26

## 2017-04-14 RX ADMIN — Medication 5 MILLIGRAM(S): at 06:26

## 2017-04-14 RX ADMIN — MORPHINE SULFATE 2 MILLIGRAM(S): 50 CAPSULE, EXTENDED RELEASE ORAL at 03:06

## 2017-04-14 RX ADMIN — RISPERIDONE 0.25 MILLIGRAM(S): 4 TABLET ORAL at 06:26

## 2017-04-14 RX ADMIN — MORPHINE SULFATE 2 MILLIGRAM(S): 50 CAPSULE, EXTENDED RELEASE ORAL at 03:05

## 2017-04-14 RX ADMIN — MORPHINE SULFATE 2 MILLIGRAM(S): 50 CAPSULE, EXTENDED RELEASE ORAL at 14:17

## 2017-04-14 RX ADMIN — MORPHINE SULFATE 2 MILLIGRAM(S): 50 CAPSULE, EXTENDED RELEASE ORAL at 12:30

## 2017-04-14 RX ADMIN — MORPHINE SULFATE 2 MILLIGRAM(S): 50 CAPSULE, EXTENDED RELEASE ORAL at 17:18

## 2017-04-14 RX ADMIN — SODIUM CHLORIDE 75 MILLILITER(S): 9 INJECTION, SOLUTION INTRAVENOUS at 10:11

## 2017-04-14 NOTE — PROGRESS NOTE ADULT - PROBLEM SELECTOR PLAN 5
- lactate wnl  - cont hydration with IVF and encourage increased PO intake  - Speech and swallow eval - recc dysphagia 2 diet

## 2017-04-14 NOTE — PROGRESS NOTE ADULT - SUBJECTIVE AND OBJECTIVE BOX
Patient seen in follow up for CALLIE. Poor PO intake. Family at bedside.     PAST MEDICAL HISTORY:  Gout  Dysphagia  COPD (chronic obstructive pulmonary disease)  Acute kidney injury  GERD (gastroesophageal reflux disease)  CKD (chronic kidney disease)  Hyperlipidemia  HTN (hypertension)    MEDICATIONS  (STANDING):  heparin  Injectable 5000Unit(s) SubCutaneous every 12 hours  predniSONE   Tablet 5milliGRAM(s) Oral daily  atorvastatin 10milliGRAM(s) Oral at bedtime  pantoprazole    Tablet 40milliGRAM(s) Oral before breakfast  allopurinol 100milliGRAM(s) Oral daily  senna 2Tablet(s) Oral at bedtime  docusate sodium 100milliGRAM(s) Oral two times a day  sodium chloride 0.45%. 1000milliLiter(s) IV Continuous <Continuous>  risperiDONE   Tablet 0.25milliGRAM(s) Oral two times a day  nystatin Powder 1Application(s) Topical two times a day  lidocaine 2% Viscous 2milliLiter(s) Swish and Spit two times a day    MEDICATIONS  (PRN):  ALBUTerol/ipratropium for Nebulization 3milliLiter(s) Nebulizer every 6 hours PRN Shortness of Breath and/or Wheezing  sodium chloride 0.65% Nasal 1Spray(s) Both Nostrils once PRN Nasal Congestion  haloperidol     Tablet 1milliGRAM(s) Oral once PRN psychosis, agitation  morphine  - Injectable 2milliGRAM(s) IV Push every 2 hours PRN Moderate Pain (4 - 6)    T(C): 36.6, Max: 37.1 (04-13 @ 13:30)  HR: 87 (79 - 90)  BP: 114/53 (94/48 - 114/53)  RR: 17 (17 - 18)  SpO2: 93% (83% - 97%)  Wt(kg): --  I&O's Detail    I & Os for current day (as of 14 Apr 2017 11:56)  =============================================  IN:    sodium chloride 0.45%.: 1725 ml    Solution: 250 ml    Solution: 50 ml    Total IN: 2025 ml  ---------------------------------------------  OUT:    Total OUT: 0 ml  ---------------------------------------------  Total NET: 2025 ml      PHYSICAL EXAM:  General: confised  Respiratory: b/l air entry, decreased breath sounds  Cardiovascular: S1 S2  Gastrointestinal: soft  Extremities:  edema    CBC Full  -  ( 14 Apr 2017 08:14 )  WBC Count : 8.4 K/uL  Hemoglobin : 11.4 g/dL  Hematocrit : 35.1 %  Platelet Count - Automated : 68 K/uL  Mean Cell Volume : 94.4 fl  Mean Cell Hemoglobin : 30.5 pg  Mean Cell Hemoglobin Concentration : 32.3 gm/dL  Auto Neutrophil # : x  Auto Lymphocyte # : x  Auto Monocyte # : x  Auto Eosinophil # : x  Auto Basophil # : x  Auto Neutrophil % : x  Auto Lymphocyte % : x  Auto Monocyte % : x  Auto Eosinophil % : x  Auto Basophil % : x    04-14    140  |  114<H>  |  53<H>  ----------------------------<  76  5.3   |  15<L>  |  2.70<H>    Ca    8.2<L>      14 Apr 2017 08:14  Phos  6.2     04-14  Mg     2.1     04-14          Sodium, Serum: 140 (04-14 @ 08:14)  Sodium, Serum: 140 (04-13 @ 08:44)  Sodium, Serum: 143 (04-12 @ 08:40)  Sodium, Serum: 141 (04-11 @ 07:57)    Creatinine, Serum: 2.70 (04-14 @ 08:14)  Creatinine, Serum: 2.90 (04-13 @ 08:44)  Creatinine, Serum: 2.80 (04-12 @ 08:40)  Creatinine, Serum: 2.70 (04-11 @ 07:57)    Potassium, Serum: 5.3 (04-14 @ 08:14)  Potassium, Serum: 4.9 (04-13 @ 08:44)  Potassium, Serum: 4.9 (04-12 @ 08:40)  Potassium, Serum: 4.5 (04-11 @ 07:57)    Hemoglobin: 11.4 (04-14 @ 08:14)  Hemoglobin: 12.0 (04-13 @ 08:44)  Hemoglobin: 11.2 (04-12 @ 08:40)  Hemoglobin: 11.1 (04-11 @ 07:57)

## 2017-04-14 NOTE — PROGRESS NOTE ADULT - PROBLEM SELECTOR PLAN 4
- Lymphoma dx radiologically   - CT Chest -  Numerous pathologically enlarged lymph nodes within the left axilla,   mediastinum, and edwar, worrisome for a neoplastic process such as   lymphoma.  - Heme/onc consulted - findings highly suspicious for met malignancy, ?lymphoma, ?solid tumor such as kidney and other sources. Prognosis poor and they discussed at length with daughter who does not want aggressive measures

## 2017-04-14 NOTE — PROGRESS NOTE ADULT - ASSESSMENT
89F pmhx hld, htn, gerd, recent discharge from [facility] for dysphagia and suspected lymphoma, c/o Disorientation 3 days ago, now admitted with altered mental status and CALLIE.

## 2017-04-14 NOTE — PROGRESS NOTE ADULT - PROBLEM SELECTOR PLAN 3
- pt confused and taking off clothes  - continue on small dose of risperidone for agitation, will monitor mental status   - possibly 2/2 uremia   - continue IVF - pt confused and taking off clothes  - continue on small dose of risperidone for agitation, will monitor mental status   - possibly 2/2 uremia   - continue IVF  - - comfort care

## 2017-04-14 NOTE — PROGRESS NOTE ADULT - PROBLEM SELECTOR PLAN 7
- stable  - continue with duoneb treatments PRN  - continue with Ocean spray for PRN nasal congestion

## 2017-04-14 NOTE — PROGRESS NOTE ADULT - SUBJECTIVE AND OBJECTIVE BOX
Patient is a 89y old  Female who presents with a chief complaint of weakness, pt unable to walk      HPI: c/c weakness   89F PMHx HLD, HTN, GERD, with recent discharge from [facility] for dysphagia and lymphoma, c/o disorientation 3 days ago, patient complaining as she could not breath.  There CT head and throat resulted with suspicion of lymphoma recommending hospice.  After discharge, patient was exhausted and disorientation persisted 2 days ago. Pt has decreased PO intake with dehydration so daughter brought her to  ED. According to pts family this is not how she was just a few days ago. Family states she is usually walking around and talking. Daughter denies any history of heart failure or kidney disease.  BUN/Cr (17 [facility])= 39 / 1.82. Pt is still confused and trying to take off clothing off overnight as the previous night. Pt family is at bedside. Daughter that is the health care proxy is very realistic and understands the likely lymphoma dx. She does not want anything invasive such as a biopsy and wishes for comfort measures. The rest of the family is upset and does not believe the dx of lymphoma.      Vital Signs Last 24 Hrs  T(C): 36.6, Max: 37.1 (04-13 @ 13:30)  T(F): 97.9, Max: 98.7 (04-13 @ 13:30)  HR: 87 (79 - 90)  BP: 114/53 (98/60 - 114/53)  BP(mean): --  RR: 17 (17 - 18)  SpO2: 93% (90% - 97%)    I&O's Detail    I & Os for current day (as of 2017 10:10)  =============================================  IN:    sodium chloride 0.45%.: 1725 ml    Solution: 250 ml    Solution: 50 ml    Total IN: 2025 ml  ---------------------------------------------  OUT:    Total OUT: 0 ml  ---------------------------------------------  Total NET:  ml      REVIEW OF SYSTEMS: unable to obtain due to confusion/agitation       PHYSICAL EXAM:  GENERAL: lethargic and agitated, taking off clothes  HEAD:  Atraumatic, Normocephalic  EYES: EOMI, PERRLA, conjunctiva and sclera clear  ENMT: intact   NECK: Supple,   NERVOUS SYSTEM:  Alert & Oriented X0,  CHEST/LUNG: decreased bilaterally; No rales, rhonchi, wheezing,   HEART: Regular rate and rhythm; No murmurs, rubs, or gallops  GI : Soft,  obese , Nontender, Nondistended; Bowel sounds present  EXTREMITIES:  2+ Peripheral Pulses, No clubbing, cyanosis, or edema  LYMPH: No lymphadenopathy noted  SKIN: blanchable red iris under right heel     MEDICATIONS  (STANDING):  heparin  Injectable 5000Unit(s) SubCutaneous every 12 hours  predniSONE   Tablet 5milliGRAM(s) Oral daily  atorvastatin 10milliGRAM(s) Oral at bedtime  amLODIPine   Tablet 5milliGRAM(s) Oral daily  pantoprazole    Tablet 40milliGRAM(s) Oral before breakfast  allopurinol 100milliGRAM(s) Oral daily  senna 2Tablet(s) Oral at bedtime  docusate sodium 100milliGRAM(s) Oral two times a day  sodium chloride 0.45%. 1000milliLiter(s) IV Continuous <Continuous>  cefTRIAXone   IVPB  IV Intermittent   azithromycin  IVPB  IV Intermittent   cefTRIAXone   IVPB 1Gram(s) IV Intermittent every 24 hours  azithromycin  IVPB 500milliGRAM(s) IV Intermittent every 24 hours    LABS                      11.4   8.4   )-----------( 68       ( 2017 08:14 )             35.1     04-14    140  |  114<H>  |  53<H>  ----------------------------<  76  5.3   |  15<L>  |  2.70<H>    Ca    8.2<L>      2017 08:14  Phos  6.2     04-14  Mg     2.1     04-14    Color: Yellow / Appearance: Clear / S.020 / pH: x  Gluc: x / Ketone: Trace  / Bili: Small / Urobili: 1   Blood: x / Protein: 75 mg/dL / Nitrite: Negative   Leuk Esterase: Trace / RBC: 0-2 /HPF / WBC 0-2   Sq Epi: x / Non Sq Epi: Occasional / Bacteria: x      RADIOLOGY & ADDITIONAL TESTS:    Imaging Personally Reviewed: YES CT SCAN ABD RENAL CYST present     B/L Renal US:Multiple bilateral renal cysts.  The indeterminate lesions described on CT the same day not visualized on   this limited study. As such MRI is suggested        CT ABD/PELVIS: No hydroureteronephrosis or urolithiasis.  Bilateral indeterminate renal cortical lesions as described. Correlate   with MR. Nonspecific mesenteric panniculitis.  Bibasilar pulmonary parenchymal nodules. Mild mediastinal adenopathy.   Correlate with chest CT.        CT CHEST - Numerous pathologically enlarged lymph nodes within the left axilla,   mediastinum, and edwar, worrisome for a neoplastic process such as   lymphoma. The large lymph node in the left axilla measures approximately   2.3 x 1.8 cm.    Tiny bilateral pleural effusions are present. Evaluation of the lung   parenchyma is limited secondary to breathing motion artifact. There is   suggestion of several scattered 4 to 5 mm nodules in the bilateral lungs.     Splenomegaly.    1.8 x 1.6 cm hypoattenuating nodule within the right lobe of the thyroid,   for which ultrasound of the thyroid may be obtained for further   evaluation.      Advance Directives: DNR Patient is a 89y old  Female who presents with a chief complaint of weakness, pt unable to walk      HPI: c/c weakness   89F PMHx HLD, HTN, GERD, with recent discharge from [facility] for dysphagia and lymphoma, c/o disorientation 3 days ago, patient complaining as she could not breath.  There CT head and throat resulted with suspicion of lymphoma recommending hospice.  After discharge, patient was exhausted and disorientation persisted 2 days ago. Pt has decreased PO intake with dehydration so daughter brought her to  ED. According to pts family this is not how she was just a few days ago. Family states she is usually walking around and talking. Daughter denies any history of heart failure or kidney disease.  BUN/Cr (17 [facility])= 39 / 1.82. Pt is still confused and trying to take off clothing off overnight as the previous night. Pt receiving IV morphine for pain PRN. Pt family is at bedside. Family decided on hospice in and was accepted - pt awaiting a bed.      Vital Signs Last 24 Hrs  T(C): 36.6, Max: 37.1 (04-13 @ 13:30)  T(F): 97.9, Max: 98.7 (04-13 @ 13:30)  HR: 87 (79 - 90)  BP: 114/53 (98/60 - 114/53)  BP(mean): --  RR: 17 (17 - 18)  SpO2: 93% (90% - 97%)    I&O's Detail    I & Os for current day (as of 2017 10:10)  =============================================  IN:    sodium chloride 0.45%.: 1725 ml    Solution: 250 ml    Solution: 50 ml    Total IN: 2025 ml  ---------------------------------------------  OUT:    Total OUT: 0 ml  ---------------------------------------------  Total NET:  ml      REVIEW OF SYSTEMS: unable to obtain due to confusion/agitation       PHYSICAL EXAM:  GENERAL: obese, lethargic and agitated, taking off clothes  HEAD:  Atraumatic, Normocephalic  EYES: EOMI, PERRLA, conjunctiva and sclera clear  ENMT: intact   NECK: Supple,   NERVOUS SYSTEM:  Alert & Oriented X0,  CHEST/LUNG: decreased bilaterally; No rales, rhonchi, wheezing,   HEART: Regular rate and rhythm; No murmurs, rubs, or gallops  GI : Soft,  obese , Nontender, Nondistended; Bowel sounds present  EXTREMITIES:  2+ Peripheral Pulses, No clubbing, cyanosis, or edema, right hand swollen from IV which was removed  LYMPH: No lymphadenopathy noted  SKIN: blanchable red iris under right heel     MEDICATIONS  (STANDING):  heparin  Injectable 5000Unit(s) SubCutaneous every 12 hours  predniSONE   Tablet 5milliGRAM(s) Oral daily  atorvastatin 10milliGRAM(s) Oral at bedtime  amLODIPine   Tablet 5milliGRAM(s) Oral daily  pantoprazole    Tablet 40milliGRAM(s) Oral before breakfast  allopurinol 100milliGRAM(s) Oral daily  senna 2Tablet(s) Oral at bedtime  docusate sodium 100milliGRAM(s) Oral two times a day  sodium chloride 0.45%. 1000milliLiter(s) IV Continuous <Continuous>  cefTRIAXone   IVPB  IV Intermittent   azithromycin  IVPB  IV Intermittent   cefTRIAXone   IVPB 1Gram(s) IV Intermittent every 24 hours  azithromycin  IVPB 500milliGRAM(s) IV Intermittent every 24 hours    LABS                      11.4   8.4   )-----------( 68       ( 2017 08:14 )             35.1     04-14    140  |  114<H>  |  53<H>  ----------------------------<  76  5.3   |  15<L>  |  2.70<H>    Ca    8.2<L>      2017 08:14  Phos  6.2     04-14  Mg     2.1     04-14    Color: Yellow / Appearance: Clear / S.020 / pH: x  Gluc: x / Ketone: Trace  / Bili: Small / Urobili: 1   Blood: x / Protein: 75 mg/dL / Nitrite: Negative   Leuk Esterase: Trace / RBC: 0-2 /HPF / WBC 0-2   Sq Epi: x / Non Sq Epi: Occasional / Bacteria: x      RADIOLOGY & ADDITIONAL TESTS:    Imaging Personally Reviewed: YES CT SCAN ABD RENAL CYST present     B/L Renal US:Multiple bilateral renal cysts.  The indeterminate lesions described on CT the same day not visualized on   this limited study. As such MRI is suggested        CT ABD/PELVIS: No hydroureteronephrosis or urolithiasis.  Bilateral indeterminate renal cortical lesions as described. Correlate   with MR. Nonspecific mesenteric panniculitis.  Bibasilar pulmonary parenchymal nodules. Mild mediastinal adenopathy.   Correlate with chest CT.        CT CHEST - Numerous pathologically enlarged lymph nodes within the left axilla,   mediastinum, and edwar, worrisome for a neoplastic process such as   lymphoma. The large lymph node in the left axilla measures approximately   2.3 x 1.8 cm.    Tiny bilateral pleural effusions are present. Evaluation of the lung   parenchyma is limited secondary to breathing motion artifact. There is   suggestion of several scattered 4 to 5 mm nodules in the bilateral lungs.     Splenomegaly.    1.8 x 1.6 cm hypoattenuating nodule within the right lobe of the thyroid,   for which ultrasound of the thyroid may be obtained for further   evaluation.      Advance Directives: DNR Patient is a 89y old  Female who presents with a chief complaint of weakness, pt unable to walk      HPI: c/c weakness   89F PMHx HLD, HTN, GERD, with recent discharge from [facility] for dysphagia and lymphoma, c/o disorientation 3 days ago, patient complaining as she could not breath.  There CT head and throat resulted with suspicion of lymphoma recommending hospice.  After discharge, patient was exhausted and disorientation persisted 2 days ago. Pt has decreased PO intake with dehydration so daughter brought her to  ED. According to pts family this is not how she was just a few days ago. Family states she is usually walking around and talking. Daughter denies any history of heart failure or kidney disease.  BUN/Cr (17 [facility])= 39 / 1.82. Pt is still confused and trying to take off clothing off overnight as the previous night. Pt receiving IV morphine for pain PRN. Pt family is at bedside. Family decided on hospice inn and was accepted - pt awaiting a bed.      Vital Signs Last 24 Hrs  T(C): 36.6, Max: 37.1 (04-13 @ 13:30)  T(F): 97.9, Max: 98.7 (04-13 @ 13:30)  HR: 87 (79 - 90)  BP: 114/53 (98/60 - 114/53)  BP(mean): --  RR: 17 (17 - 18)  SpO2: 93% (90% - 97%)    I&O's Detail    I & Os for current day (as of 2017 10:10)  =============================================  IN:    sodium chloride 0.45%.: 1725 ml    Solution: 250 ml    Solution: 50 ml    Total IN: 2025 ml  ---------------------------------------------  OUT:    Total OUT: 0 ml  ---------------------------------------------  Total NET:  ml      REVIEW OF SYSTEMS: unable to obtain due to confusion/agitation       PHYSICAL EXAM:  GENERAL: obese, lethargic and agitated, taking off clothes  HEAD:  Atraumatic, Normocephalic  EYES: EOMI, PERRLA, conjunctiva and sclera clear  ENMT: intact   NECK: Supple,   NERVOUS SYSTEM:  Alert & Oriented X0,  CHEST/LUNG: decreased bilaterally; No rales, rhonchi, wheezing,   HEART: Regular rate and rhythm; No murmurs, rubs, or gallops  GI : Soft,  obese , Nontender, Nondistended; Bowel sounds present  EXTREMITIES:  2+ Peripheral Pulses, No clubbing, cyanosis, or edema, right hand swollen from IV which was removed  LYMPH: No lymphadenopathy noted  SKIN: blanchable red iris under right heel     MEDICATIONS  (STANDING):  heparin  Injectable 5000Unit(s) SubCutaneous every 12 hours  predniSONE   Tablet 5milliGRAM(s) Oral daily  atorvastatin 10milliGRAM(s) Oral at bedtime  amLODIPine   Tablet 5milliGRAM(s) Oral daily  pantoprazole    Tablet 40milliGRAM(s) Oral before breakfast  allopurinol 100milliGRAM(s) Oral daily  senna 2Tablet(s) Oral at bedtime  docusate sodium 100milliGRAM(s) Oral two times a day  sodium chloride 0.45%. 1000milliLiter(s) IV Continuous <Continuous>  cefTRIAXone   IVPB  IV Intermittent   azithromycin  IVPB  IV Intermittent   cefTRIAXone   IVPB 1Gram(s) IV Intermittent every 24 hours  azithromycin  IVPB 500milliGRAM(s) IV Intermittent every 24 hours    LABS                      11.4   8.4   )-----------( 68       ( 2017 08:14 )             35.1     04-14    140  |  114<H>  |  53<H>  ----------------------------<  76  5.3   |  15<L>  |  2.70<H>    Ca    8.2<L>      2017 08:14  Phos  6.2     04-14  Mg     2.1     04-14    Color: Yellow / Appearance: Clear / S.020 / pH: x  Gluc: x / Ketone: Trace  / Bili: Small / Urobili: 1   Blood: x / Protein: 75 mg/dL / Nitrite: Negative   Leuk Esterase: Trace / RBC: 0-2 /HPF / WBC 0-2   Sq Epi: x / Non Sq Epi: Occasional / Bacteria: x      RADIOLOGY & ADDITIONAL TESTS:    Imaging Personally Reviewed: YES CT SCAN ABD RENAL CYST present     B/L Renal US:Multiple bilateral renal cysts.  The indeterminate lesions described on CT the same day not visualized on   this limited study. As such MRI is suggested        CT ABD/PELVIS: No hydroureteronephrosis or urolithiasis.  Bilateral indeterminate renal cortical lesions as described. Correlate   with MR. Nonspecific mesenteric panniculitis.  Bibasilar pulmonary parenchymal nodules. Mild mediastinal adenopathy.   Correlate with chest CT.        CT CHEST - Numerous pathologically enlarged lymph nodes within the left axilla,   mediastinum, and edwar, worrisome for a neoplastic process such as   lymphoma. The large lymph node in the left axilla measures approximately   2.3 x 1.8 cm.    Tiny bilateral pleural effusions are present. Evaluation of the lung   parenchyma is limited secondary to breathing motion artifact. There is   suggestion of several scattered 4 to 5 mm nodules in the bilateral lungs.     Splenomegaly.    1.8 x 1.6 cm hypoattenuating nodule within the right lobe of the thyroid,   for which ultrasound of the thyroid may be obtained for further   evaluation.      Advance Directives: DNR

## 2017-04-14 NOTE — PROGRESS NOTE ADULT - ATTENDING COMMENTS
Patient remains with altered mental status, but some improvement on Risperidone. Patient requiring Morphine for pain control. Continue IV fluids. Creatinine very modestly improved, but patient with extremely poor po intake. Accepted to [facility], but awaiting a bed.

## 2017-04-14 NOTE — PROGRESS NOTE ADULT - PROBLEM SELECTOR PLAN 1
- CALLIE  on CKD3?  - renal function improving   - GFR 14, BUN 53 from 53 yesterday, Cr 2.7 from 2.9 yesterday, Cl 114, CO2 15, Ca 8.2, f/u electrolyte in AM  - Pt seen by Dr. Velez, nephro - stable renal function. Continue IVF. - CALLIE  on CKD3?  - renal function improving   - GFR 14, BUN 53 from 53 yesterday, Cr 2.7 from 2.9 yesterday, Cl 114, CO2 15, Ca 8.2, f/u electrolyte in AM  - Pt seen by Dr. Velez, nephro - stable renal function. Continue IVF.  - family decided on comfort care - waiting bed at hospice in  [facility] - CALLIE  on CKD3?  - renal function improving   - GFR 14, BUN 53 from 53 yesterday, Cr 2.7 from 2.9 yesterday, Cl 114, CO2 15, Ca 8.2, f/u electrolyte in AM  - Pt seen by Dr. Velez, nephro - stable renal function. Continue IVF.  - family decided on comfort care - waiting bed at hospice in  - palliative care order set in - continue with IV morphine PRN, Atropine, senna, and colace

## 2017-04-14 NOTE — PROGRESS NOTE ADULT - PROBLEM SELECTOR PLAN 2
- possible PNA r/o - CT chest eng for PNA and no elevated WCB  - CT Chest - Numerous pathologically enlarged lymph nodes and bilateral pleural effusions are present.     -  CT [place] showed - Bibasilar pulmonary parenchymal nodules. Mild mediastinal adenopathy.  - d/c Rocephin and azithromycin as no signs PNA - possible PNA r/o - CT chest eng for PNA and no elevated WCB  - CT Chest - Numerous pathologically enlarged lymph nodes and bilateral pleural effusions are present.     -  CT [place] showed - Bibasilar pulmonary parenchymal nodules. Mild mediastinal adenopathy.  - d/c Rocephin and azithromycin as no signs PNA  - comfort care

## 2017-04-15 VITALS — TEMPERATURE: 98 F

## 2017-04-15 LAB
CULTURE RESULTS: SIGNIFICANT CHANGE UP
CULTURE RESULTS: SIGNIFICANT CHANGE UP
SPECIMEN SOURCE: SIGNIFICANT CHANGE UP
SPECIMEN SOURCE: SIGNIFICANT CHANGE UP

## 2017-04-15 PROCEDURE — 81001 URINALYSIS AUTO W/SCOPE: CPT

## 2017-04-15 PROCEDURE — 80048 BASIC METABOLIC PNL TOTAL CA: CPT

## 2017-04-15 PROCEDURE — 83690 ASSAY OF LIPASE: CPT

## 2017-04-15 PROCEDURE — 71250 CT THORAX DX C-: CPT

## 2017-04-15 PROCEDURE — 71045 X-RAY EXAM CHEST 1 VIEW: CPT

## 2017-04-15 PROCEDURE — 96375 TX/PRO/DX INJ NEW DRUG ADDON: CPT

## 2017-04-15 PROCEDURE — 87086 URINE CULTURE/COLONY COUNT: CPT

## 2017-04-15 PROCEDURE — 99233 SBSQ HOSP IP/OBS HIGH 50: CPT | Mod: GC

## 2017-04-15 PROCEDURE — 87040 BLOOD CULTURE FOR BACTERIA: CPT

## 2017-04-15 PROCEDURE — 76775 US EXAM ABDO BACK WALL LIM: CPT

## 2017-04-15 PROCEDURE — 84145 PROCALCITONIN (PCT): CPT

## 2017-04-15 PROCEDURE — 82150 ASSAY OF AMYLASE: CPT

## 2017-04-15 PROCEDURE — 83605 ASSAY OF LACTIC ACID: CPT

## 2017-04-15 PROCEDURE — 97162 PT EVAL MOD COMPLEX 30 MIN: CPT

## 2017-04-15 PROCEDURE — 99285 EMERGENCY DEPT VISIT HI MDM: CPT | Mod: 25

## 2017-04-15 PROCEDURE — 83735 ASSAY OF MAGNESIUM: CPT

## 2017-04-15 PROCEDURE — 84100 ASSAY OF PHOSPHORUS: CPT

## 2017-04-15 PROCEDURE — 74176 CT ABD & PELVIS W/O CONTRAST: CPT

## 2017-04-15 PROCEDURE — 96374 THER/PROPH/DIAG INJ IV PUSH: CPT

## 2017-04-15 PROCEDURE — 73502 X-RAY EXAM HIP UNI 2-3 VIEWS: CPT

## 2017-04-15 PROCEDURE — 94640 AIRWAY INHALATION TREATMENT: CPT

## 2017-04-15 PROCEDURE — 85027 COMPLETE CBC AUTOMATED: CPT

## 2017-04-15 PROCEDURE — 93005 ELECTROCARDIOGRAM TRACING: CPT

## 2017-04-15 PROCEDURE — 80053 COMPREHEN METABOLIC PANEL: CPT

## 2017-04-15 RX ORDER — MORPHINE SULFATE 50 MG/1
2 CAPSULE, EXTENDED RELEASE ORAL EVERY 4 HOURS
Refills: 0 | Status: DISCONTINUED | OUTPATIENT
Start: 2017-04-15 | End: 2017-04-15

## 2017-04-15 RX ADMIN — MORPHINE SULFATE 2 MILLIGRAM(S): 50 CAPSULE, EXTENDED RELEASE ORAL at 00:12

## 2017-04-15 RX ADMIN — MORPHINE SULFATE 2 MILLIGRAM(S): 50 CAPSULE, EXTENDED RELEASE ORAL at 13:35

## 2017-04-15 RX ADMIN — MORPHINE SULFATE 2 MILLIGRAM(S): 50 CAPSULE, EXTENDED RELEASE ORAL at 09:24

## 2017-04-15 RX ADMIN — MORPHINE SULFATE 2 MILLIGRAM(S): 50 CAPSULE, EXTENDED RELEASE ORAL at 03:50

## 2017-04-15 RX ADMIN — MORPHINE SULFATE 2 MILLIGRAM(S): 50 CAPSULE, EXTENDED RELEASE ORAL at 01:00

## 2017-04-15 RX ADMIN — MORPHINE SULFATE 2 MILLIGRAM(S): 50 CAPSULE, EXTENDED RELEASE ORAL at 15:24

## 2017-04-15 RX ADMIN — MORPHINE SULFATE 2 MILLIGRAM(S): 50 CAPSULE, EXTENDED RELEASE ORAL at 12:32

## 2017-04-15 RX ADMIN — MORPHINE SULFATE 2 MILLIGRAM(S): 50 CAPSULE, EXTENDED RELEASE ORAL at 10:15

## 2017-04-15 RX ADMIN — Medication 1 SPRAY(S): at 17:22

## 2017-04-15 RX ADMIN — Medication 1 DROP(S): at 17:22

## 2017-04-15 RX ADMIN — NYSTATIN CREAM 1 APPLICATION(S): 100000 CREAM TOPICAL at 05:58

## 2017-04-15 RX ADMIN — MORPHINE SULFATE 2 MILLIGRAM(S): 50 CAPSULE, EXTENDED RELEASE ORAL at 16:21

## 2017-04-15 RX ADMIN — Medication 3 MILLILITER(S): at 03:44

## 2017-04-15 RX ADMIN — MORPHINE SULFATE 2 MILLIGRAM(S): 50 CAPSULE, EXTENDED RELEASE ORAL at 05:58

## 2017-04-15 NOTE — PROGRESS NOTE ADULT - PROBLEM SELECTOR PLAN 9
likely 2/2 pt hitting it on bed from agitation vs pressure  - foot elevated with pillows since pt keeps removing off-loading boots due to the confusion   - will monitor

## 2017-04-15 NOTE — PROGRESS NOTE ADULT - PROBLEM SELECTOR PLAN 2
- possible PNA r/o - CT chest eng for PNA and no elevated WCB  - CT Chest - Numerous pathologically enlarged lymph nodes and bilateral pleural effusions are present.     -  CT [place] showed - Bibasilar pulmonary parenchymal nodules. Mild mediastinal adenopathy.  - d/c Rocephin and azithromycin as no signs PNA  - comfort care -  ruled out - CT chest eng for PNA and no elevated WCB  - CT Chest - Numerous pathologically enlarged lymph nodes and bilateral pleural effusions are present.     -  CT [place] showed - Bibasilar pulmonary parenchymal nodules. Mild mediastinal adenopathy.  - d/c Rocephin and azithromycin as no signs PNA  - comfort care

## 2017-04-15 NOTE — DISCHARGE NOTE FOR THE EXPIRED PATIENT - HOSPITAL COURSE
89F PMHx HLD, HTN, GERD, with recent discharge from [facility] for dysphagia and lymphoma, c/o disorientation 3 days ago, patient complaining as she could not breath.  There CT head and throat resulted with suspicion of lymphoma recommending hospice.  After discharge, patient was exhausted and disorientation persisted 2 days ago. Pt had decreased PO intake with dehydration so daughter brought her to [facility]. According to pts family this is not how she was just a few days ago. Family stated she is usually walking around and talking. Daughter denies any history of heart failure or kidney disease.  BUN/Cr (17 [facility])= 39 / 1.82. (10 Apr 2017 12:26)     Renal insufficiency likely CALLIE  on CKD3. Pt seen by yuly Holt. IVF were given. Bladder scan showed no residual, r/o retention. Electrolytes abnormal from CALLIE. B/L Renal US showed multiple bilateral renal cysts. CT ABD/PELVIS: No hydroureteronephrosis or urolithiasis. Bilateral indeterminate renal cortical lesions as described. Nonspecific mesenteric panniculitis. Bibasilar pulmonary parenchymal nodules. Mild mediastinal adenopathy. CT CHEST - Numerous pathologically enlarged lymph nodes with Tiny bilateral pleural effusions are present. Pt was severely dehydrated and was encouraged increased PO intake and to continue IVF. Speech and swallow eval suggested dysphagia 2 diet.  Pt treated for possible PNA since XR which showed - Bilateral lower lobe atelectasis or infiltrate and due to bands were 14%, procalcitonin .5 with AMS. Pt was started on Rocephin and azithromycin. HTN was controlled with low salt diet and Amlodipine. Pt likely has lymphoma dx based on CT scans. Heme/onc consulted and agreed, believe prognosis is poor, and they discussed at length with daughter, the health care proxy, she does not want aggressive measures such as a biopsy to confirm dx. Pt stayed confused with an altered mental status. Risperidone was started and moprhine PRN for pain control. Family has decided on comfort care measures and to d/c to hospice inn. pt was placed on comfort care order while waiting to transfer to hospices in pt  sec to cardio pulmonary arrest.

## 2017-04-15 NOTE — PROGRESS NOTE ADULT - ATTENDING COMMENTS
Patient remains with altered mental status, but some improvement on Risperidone. Patient requiring Morphine for pain control. Continue IV fluids. Creatinine very modestly improved, but patient with extremely poor po intake. Accepted to [facility], but awaiting a bed. see above assessment and plan pt / hospices in appropriate with acute resp distress possible sec to volume overload  / dc iv fluid , increase morphine , daughter aware does not want any aggressive intervention dc all po meds /no lab , no vitals , waiting for hospices in bed.

## 2017-04-15 NOTE — CHART NOTE - NSCHARTNOTEFT_GEN_A_CORE
Expiration Note- PGY1 On Call    Assessed patient at bedside as patient appears to not be breathing and nurse reports she is pulseless.   Patient found to be unresponsive to voice or pain. Pupils were not reactive.  There was no corneal reflex.  There was no carotid or femoral pulse. There were no breath sounds.     Time of death: 17:56  Attending Dr. Bess notified.   Multiple family members at bedside, including daughter Gale Penaloza, and were notified.  More family members will be coming to the hospital.

## 2017-04-15 NOTE — PROGRESS NOTE ADULT - PROBLEM SELECTOR PLAN 3
- pt confused and taking off clothes  - continue on small dose of risperidone for agitation, will monitor mental status   - possibly 2/2 uremia   - continue IVF  - - comfort care

## 2017-04-15 NOTE — PROGRESS NOTE ADULT - ASSESSMENT
89F pmhx hld, htn, gerd, recent discharge from [facility] for dysphagia and suspected lymphoma, c/o Disorientation 3 days ago, now admitted with altered mental status and CALLIE. 89F pmhx hld, htn, gerd, recent discharge from [facility] for dysphagia and suspected lymphoma, c/o Disorientation 3 days ago, now admitted with altered mental status and CALLIE sec to uremia acute encephalopathy .

## 2017-04-15 NOTE — PROGRESS NOTE ADULT - PROBLEM SELECTOR PLAN 5
- lactate wnl  - cont hydration with IVF and encourage increased PO intake  - Speech and swallow eval - recc dysphagia 2 diet s/p iv fluid

## 2017-04-15 NOTE — PROGRESS NOTE ADULT - PROBLEM SELECTOR PLAN 1
- CALLIE  on CKD3?  - renal function improving   - GFR 14, BUN 53 from 53 yesterday, Cr 2.7 from 2.9 yesterday, Cl 114, CO2 15, Ca 8.2, f/u electrolyte in AM  - Pt seen by Dr. Velez, nephro - stable renal function. Continue IVF.  - family decided on comfort care - waiting bed at hospice in  - palliative care order set in - continue with IV morphine PRN, Atropine, senna, and colace - CALLIE  on CKD3 - renal function not improving   - Pt seen by Dr. Velez, nephro -   - family decided on comfort care - waiting bed at hospice in  - palliative care order set in - continue with IV morphine PRN, Atropine, senna, and colace

## 2017-04-15 NOTE — PROGRESS NOTE ADULT - NSHPATTENDINGPLANDISCUSS_GEN_ALL_CORE
[facility] medical director, Renal, Social work, daughter, walter.
family / daughter bedside aware with plan.
[facility]ice [facility] medical director, Renal, Social work, daughter, walter.
daughter at bedside, granddaughter via telephone.

## 2017-04-15 NOTE — PROGRESS NOTE ADULT - PROBLEM SELECTOR PROBLEM 1
Acute kidney injury
Acute kidney injury
Renal insufficiency
Acute kidney injury
Renal insufficiency

## 2017-04-15 NOTE — PROGRESS NOTE ADULT - SUBJECTIVE AND OBJECTIVE BOX
HPI: c/c resp distress  89F PMHx HLD, HTN, GERD, with recent discharge from [facility] for dysphagia and lymphoma, c/o disorientation 3 days ago, patient complaining as she could not breath.  There CT head and throat resulted with suspicion of lymphoma recommending hospice. pt found to have kirk / uremia did not respond to iv fluid     Vital Signs Last 24 Hrs  T(C): 36.9, Max: 37 (04-14 @ 21:20)  T(F): 98.5, Max: 98.6 (04-14 @ 21:20)  HR: 87 (78 - 88)  BP: 106/57 (106/57 - 114/67)  BP(mean): --  RR: 17 (16 - 17)  SpO2: 96% (94% - 96%)    REVIEW OF SYSTEMS:  CONSTITUTIONAL: No fever, weight loss, or fatigue  EYES: No eye pain, visual disturbances, or discharge  ENMT:  No difficulty hearing, tinnitus, vertigo; No sinus or throat pain  NECK: No pain or stiffness  BREASTS: No pain, no masses,   RESPIRATORY: No cough, wheezing, chills or hemoptysis; No shortness of breath  CARDIOVASCULAR: No chest pain, palpitations, dizziness, or leg swelling  GASTROINTESTINAL: No abdominal or epigastric pain. No nausea, vomiting, or hematemesis; No diarrhea or constipation. No melena or hematochezia.  GENITOURINARY: No dysuria, frequency, hematuria, or incontinence  NEUROLOGICAL: No headaches, memory loss, loss of strength, numbness, or tremors  SKIN: No itching, burning, rashes, or lesions   LYMPH NODES: No enlarged glands  MUSCULOSKELETAL: No joint pain or swelling; No muscle, back, or extremity pain      PHYSICAL EXAM:  GENERAL: NAD, well-groomed, well-developed  HEAD:  Atraumatic, Normocephalic  EYES: EOMI, PERRLA, conjunctiva and sclera clear  ENMT: No tonsillar erythema, exudates, or enlargement; Moist mucous membranes, Good dentition, No lesions  NECK: Supple, No JVD, Normal thyroid  NERVOUS SYSTEM:  Alert & Oriented X3, Good concentration; Motor Strength 5/5 B/L upper and lower extremities; DTRs 2+ intact and symmetric  CHEST/LUNG: Clear to percussion bilaterally; No rales, rhonchi, wheezing, or rubs  HEART: Regular rate and rhythm; No murmurs, rubs, or gallops  ABDOMEN: Soft, Nontender, Nondistended; Bowel sounds present  EXTREMITIES:  2+ Peripheral Pulses, No clubbing, cyanosis, or edema  LYMPH: No lymphadenopathy noted  SKIN: No rashes or lesions    MEDICATIONS  (STANDING):  senna 2Tablet(s) Oral at bedtime  docusate sodium 100milliGRAM(s) Oral two times a day  risperiDONE   Tablet 0.25milliGRAM(s) Oral two times a day  nystatin Powder 1Application(s) Topical two times a day  morphine  - Injectable 2milliGRAM(s) IV Push every 4 hours    MEDICATIONS  (PRN):  ALBUTerol/ipratropium for Nebulization 3milliLiter(s) Nebulizer every 6 hours PRN Shortness of Breath and/or Wheezing  sodium chloride 0.65% Nasal 1Spray(s) Both Nostrils once PRN Nasal Congestion  haloperidol     Tablet 1milliGRAM(s) Oral once PRN psychosis, agitation  morphine  - Injectable 2milliGRAM(s) IV Push every 2 hours PRN Moderate Pain (4 - 6)  atropine 1% Ophthalmic Solution for SubLingual Use 1Drop(s) SubLingual every 6 hours PRN Secretions  artificial  tears Solution 2Drop(s) Both EYES every 1 hour PRN Dry Eyes      LABS:                        11.4   8.4   )-----------( 68       ( 14 Apr 2017 08:14 )             35.1     04-14    140  |  114<H>  |  53<H>  ----------------------------<  76  5.3   |  15<L>  |  2.70<H>    Ca    8.2<L>      14 Apr 2017 08:14  Phos  6.2     04-14  Mg     2.1     04-14          CAPILLARY BLOOD GLUCOSE              RADIOLOGY & ADDITIONAL TESTS:    Imaging Personally Reviewed:       Advance Directives:      Palliative Care: HPI: c/c resp distress  89F PMHx HLD, HTN, GERD, with recent discharge from [facility] for dysphagia and lymphoma, c/o disorientation 3 days ago, patient complaining as she could not breath.  There CT head and throat resulted with suspicion of lymphoma recommending hospice. pt found to have kirk / uremia did not respond to iv fluid  / uremia , acute resp distress  possible volume overlode.    Vital Signs Last 24 Hrs  T(C): 36.9, Max: 37 (04-14 @ 21:20)  T(F): 98.5, Max: 98.6 (04-14 @ 21:20)  HR: 87 (78 - 88)  BP: 106/57 (106/57 - 114/67)  BP(mean): --  RR: 17 (16 - 17)  SpO2: 96% (94% - 96%)    REVIEW OF SYSTEMS:  CONSTITUTIONAL: lethargy    pt unable to respond   PHYSICAL EXAM    GENERAL: resp distress    HEAD:  Atraumatic, Normocephalic  EYES: EOMI, PERRLA,   ENMT: intact   NECK: Supple, No JVD,   NERVOUS SYSTEM:  Alert & Oriented X0, altered   CHEST/LUNG: bl coarse breath sound   HEART: Regular rate and rhythm; No murmurs, non tachy   gi : Soft, Nontender, Nondistended; Bowel sounds present  EXTREMITIES:  2+ Peripheral Pulses, no edema  LYMPH: No lymphadenopathy noted  SKIN: No rashes or lesions    MEDICATIONS  (STANDING):  senna 2Tablet(s) Oral at bedtime  docusate sodium 100milliGRAM(s) Oral two times a day  risperiDONE   Tablet 0.25milliGRAM(s) Oral two times a day  nystatin Powder 1Application(s) Topical two times a day  morphine  - Injectable 2milliGRAM(s) IV Push every 4 hours    MEDICATIONS  (PRN):  ALBUTerol/ipratropium for Nebulization 3milliLiter(s) Nebulizer every 6 hours PRN Shortness of Breath and/or Wheezing  sodium chloride 0.65% Nasal 1Spray(s) Both Nostrils once PRN Nasal Congestion  haloperidol     Tablet 1milliGRAM(s) Oral once PRN psychosis, agitation  morphine  - Injectable 2milliGRAM(s) IV Push every 2 hours PRN Moderate Pain (4 - 6)  atropine 1% Ophthalmic Solution for SubLingual Use 1Drop(s) SubLingual every 6 hours PRN Secretions  artificial  tears Solution 2Drop(s) Both EYES every 1 hour PRN Dry Eyes      LABS:                        11.4   8.4   )-----------( 68       ( 14 Apr 2017 08:14 )             35.1     04-14    140  |  114<H>  |  53<H>  ----------------------------<  76  5.3   |  15<L>  |  2.70<H>    Ca    8.2<L>      14 Apr 2017 08:14  Phos  6.2     04-14  Mg     2.1     04-14      no more blood work on hospisce care     CAPILLARY BLOOD GLUCOSE                Imaging Personally Reviewed: no new result       Advance Directives: dnr/dni   on comfort care

## 2017-04-19 DIAGNOSIS — N28.1 CYST OF KIDNEY, ACQUIRED: ICD-10-CM

## 2017-04-19 DIAGNOSIS — J44.9 CHRONIC OBSTRUCTIVE PULMONARY DISEASE, UNSPECIFIED: ICD-10-CM

## 2017-04-19 DIAGNOSIS — I13.0 HYPERTENSIVE HEART AND CHRONIC KIDNEY DISEASE WITH HEART FAILURE AND STAGE 1 THROUGH STAGE 4 CHRONIC KIDNEY DISEASE, OR UNSPECIFIED CHRONIC KIDNEY DISEASE: ICD-10-CM

## 2017-04-19 DIAGNOSIS — R59.0 LOCALIZED ENLARGED LYMPH NODES: ICD-10-CM

## 2017-04-19 DIAGNOSIS — E78.5 HYPERLIPIDEMIA, UNSPECIFIED: ICD-10-CM

## 2017-04-19 DIAGNOSIS — E87.1 HYPO-OSMOLALITY AND HYPONATREMIA: ICD-10-CM

## 2017-04-19 DIAGNOSIS — G93.40 ENCEPHALOPATHY, UNSPECIFIED: ICD-10-CM

## 2017-04-19 DIAGNOSIS — E83.42 HYPOMAGNESEMIA: ICD-10-CM

## 2017-04-19 DIAGNOSIS — J98.11 ATELECTASIS: ICD-10-CM

## 2017-04-19 DIAGNOSIS — K21.9 GASTRO-ESOPHAGEAL REFLUX DISEASE WITHOUT ESOPHAGITIS: ICD-10-CM

## 2017-04-19 DIAGNOSIS — I25.10 ATHEROSCLEROTIC HEART DISEASE OF NATIVE CORONARY ARTERY WITHOUT ANGINA PECTORIS: ICD-10-CM

## 2017-04-19 DIAGNOSIS — E86.0 DEHYDRATION: ICD-10-CM

## 2017-04-19 DIAGNOSIS — J80 ACUTE RESPIRATORY DISTRESS SYNDROME: ICD-10-CM

## 2017-04-19 DIAGNOSIS — Z66 DO NOT RESUSCITATE: ICD-10-CM

## 2017-04-19 DIAGNOSIS — I50.9 HEART FAILURE, UNSPECIFIED: ICD-10-CM

## 2017-04-19 DIAGNOSIS — F03.90 UNSPECIFIED DEMENTIA WITHOUT BEHAVIORAL DISTURBANCE: ICD-10-CM

## 2017-04-19 DIAGNOSIS — M10.9 GOUT, UNSPECIFIED: ICD-10-CM

## 2017-04-19 DIAGNOSIS — N18.3 CHRONIC KIDNEY DISEASE, STAGE 3 (MODERATE): ICD-10-CM

## 2017-04-19 DIAGNOSIS — Z85.3 PERSONAL HISTORY OF MALIGNANT NEOPLASM OF BREAST: ICD-10-CM

## 2017-04-19 DIAGNOSIS — N17.9 ACUTE KIDNEY FAILURE, UNSPECIFIED: ICD-10-CM

## 2017-04-19 DIAGNOSIS — M16.11 UNILATERAL PRIMARY OSTEOARTHRITIS, RIGHT HIP: ICD-10-CM

## 2017-04-19 DIAGNOSIS — M79.673 PAIN IN UNSPECIFIED FOOT: ICD-10-CM

## 2017-04-19 DIAGNOSIS — C85.90 NON-HODGKIN LYMPHOMA, UNSPECIFIED, UNSPECIFIED SITE: ICD-10-CM

## 2017-11-07 NOTE — PROGRESS NOTE ADULT - SUBJECTIVE AND OBJECTIVE BOX
Patient is a 89y old  Female who presents with a chief complaint of weakness, pt unable to walk (10 Apr 2017 13:50)      HPI: c/c weakness   89F pmhx hld, htn, gerd, recent discharge from [facility] for dysphagia and lymphoma, c/o Disorientation 3 days ago, patient complaining as she could not breath.  Taken to [facility], ct head and throat resulted with suspicion of lymphoma recommending hospice.  After discharge, patient was exhausted and disorientation persisted 2 days ago. pt decrease po intake  / dehydrations     Daughter denies any history of heart failure or kidney disease.  BUN/Cr (17 [facility])= 39 / 1.82. (10 Apr 2017 12:26)       INTERVAL HPI/OVERNIGHT EVENTS:  T(C): 37.1, Max: 37.1 (- @ 05:49)  HR: 87 (67 - 97)  BP: 110/50 (97/65 - 138/62)  RR: 18 (14 - 20)  SpO2: 92% (92% - 98%)  Wt(kg): --  I&O's Summary    I & Os for current day (as of 2017 10:22)  =============================================  IN: 840 ml / OUT: 251 ml / NET: 589 ml      REVIEW OF SYSTEMS:  CONSTITUTIONAL: weakness / fatigue   EYES: No eye pain, visual disturbances, or discharge  ENMT:  No difficulty hearing, tinnitus, vertigo; No sinus or throat pain  NECK: No pain or stiffness  RESPIRATORY: No cough, wheezing, chills or hemoptysis; No shortness of breath  CARDIOVASCULAR: No chest pain, palpitations, dizziness, or leg swelling  GASTROINTESTINAL: No abdominal or epigastric pain. No nausea, vomiting, or hematemesis ;constipation present  GENITOURINARY: No dysuria, frequency, hematuria, or incontinence  NEUROLOGICAL: No headaches, memory loss, loss of strength, numbness, or tremors  SKIN: No itching, burning, rashes, or lesions   MUSCULOSKELETAL joint pain shoulder ; No muscle, back, or extremity pain    PHYSICAL EXAM:  GENERAL: lethargic   HEAD:  Atraumatic, Normocephalic  EYES: EOMI, PERRLA, conjunctiva and sclera clear  ENMT: intact   NECK: Supple,   NERVOUS SYSTEM:  Alert & Oriented X1,  CHEST/LUNG: Clear to percussion bilaterally; No rales, rhonchi, wheezing,   HEART: Regular rate and rhythm; No murmurs, rubs, or gallops  GI : Soft,  obese , Nontender, Nondistended; Bowel sounds present  EXTREMITIES:  2+ Peripheral Pulses, No clubbing, cyanosis, or edema  LYMPH: No lymphadenopathy noted  SKIN: No rash    INTACT     MEDICATIONS  (STANDING):  heparin  Injectable 5000Unit(s) SubCutaneous every 12 hours  sodium chloride 0.9%. 1000milliLiter(s) IV Continuous <Continuous>  predniSONE   Tablet 5milliGRAM(s) Oral daily  atorvastatin 10milliGRAM(s) Oral at bedtime  amLODIPine   Tablet 5milliGRAM(s) Oral daily  pantoprazole    Tablet 40milliGRAM(s) Oral before breakfast  allopurinol 100milliGRAM(s) Oral daily  senna 2Tablet(s) Oral at bedtime  docusate sodium 100milliGRAM(s) Oral two times a day    MEDICATIONS  (PRN):  ALBUTerol/ipratropium for Nebulization 3milliLiter(s) Nebulizer every 6 hours PRN Shortness of Breath and/or Wheezing  sodium chloride 0.65% Nasal 1Spray(s) Both Nostrils once PRN Nasal Congestion      LABS:                        11.1   6.3   )-----------( x        ( 2017 07:57 )             35.3     04-11    141  |  113<H>  |  45<H>  ----------------------------<  64<L>  4.5   |  18<L>  |  2.70<H>    Ca    7.6<L>      2017 07:57  Phos  5.2     04-11  Mg     1.6     11    TPro  8.2  /  Alb  1.0<L>  /  TBili  0.5  /  DBili  x   /  AST  37  /  ALT  19  /  AlkPhos  71  04-10      Urinalysis Basic - ( 10 Apr 2017 08:48 )    Color: Yellow / Appearance: Clear / S.020 / pH: x  Gluc: x / Ketone: Trace  / Bili: Small / Urobili: 1   Blood: x / Protein: 75 mg/dL / Nitrite: Negative   Leuk Esterase: Trace / RBC: 0-2 /HPF / WBC 0-2   Sq Epi: x / Non Sq Epi: Occasional / Bacteria: x                  RADIOLOGY & ADDITIONAL TESTS:    Imaging Personally Reviewed: YES CT SCAN ABD RENAL CYST present       Advance Directives:DNR Clothing

## 2021-10-01 NOTE — PROVIDER CONTACT NOTE (CRITICAL VALUE NOTIFICATION) - ACTION/TREATMENT ORDERED:
Received call from [facility]. Dr Cano prescribed Lyrica 7/8/21 150 mg TID. Patient just picked up a new prescription from Dr Gerald Coyle-Neurologist from [facility], for Lyrica 300 mg BID. Calling to let Dr Cano know this was a change from what Dr Cano had prescribed.    md will evaluate

## 2022-01-25 NOTE — PHYSICAL THERAPY INITIAL EVALUATION ADULT - HEALTH SCREEN CRITERIA
yes Hydroxychloroquine Pregnancy And Lactation Text: This medication has been shown to cause fetal harm but it isn't assigned a Pregnancy Risk Category. There are small amounts excreted in breast milk.

## 2022-12-21 NOTE — PHYSICAL THERAPY INITIAL EVALUATION ADULT - AMBULATION SKILLS, REHAB EVAL
rollator/independent/needs device
4 wheeled rolling walker/independent/needs device
Vaccine status unknown

## 2023-05-26 NOTE — ED ADULT NURSE REASSESSMENT NOTE - NAUSEA/VOMITING
0=no nausea with no vomiting
Detail Level: Detailed
Quality 226: Preventive Care And Screening: Tobacco Use: Screening And Cessation Intervention: Patient screened for tobacco use and is an ex/non-smoker

## 2023-10-16 RX ORDER — ALLOPURINOL 300 MG
0 TABLET ORAL
Qty: 0 | Refills: 0 | DISCHARGE

## 2023-10-16 RX ORDER — AMLODIPINE BESYLATE 2.5 MG/1
0 TABLET ORAL
Qty: 0 | Refills: 0 | DISCHARGE

## 2023-10-16 RX ORDER — OMEPRAZOLE 10 MG/1
0 CAPSULE, DELAYED RELEASE ORAL
Qty: 0 | Refills: 0 | DISCHARGE

## 2023-10-16 RX ORDER — ERGOCALCIFEROL 1.25 MG/1
0 CAPSULE ORAL
Qty: 0 | Refills: 0 | DISCHARGE

## 2023-10-16 RX ORDER — VALSARTAN 80 MG/1
0 TABLET ORAL
Qty: 0 | Refills: 0 | DISCHARGE

## 2023-10-16 RX ORDER — ALLOPURINOL 300 MG
2.5 TABLET ORAL
Qty: 0 | Refills: 0 | DISCHARGE

## 2023-10-16 RX ORDER — ATORVASTATIN CALCIUM 80 MG/1
10 TABLET, FILM COATED ORAL
Qty: 0 | Refills: 0 | DISCHARGE

## 2023-10-16 RX ORDER — ALBUTEROL 90 UG/1
3 AEROSOL, METERED ORAL
Qty: 0 | Refills: 0 | DISCHARGE

## 2023-10-16 RX ORDER — OMEPRAZOLE 10 MG/1
40 CAPSULE, DELAYED RELEASE ORAL
Qty: 0 | Refills: 0 | DISCHARGE

## 2023-10-16 RX ORDER — ALLOPURINOL 300 MG
1 TABLET ORAL
Qty: 0 | Refills: 0 | DISCHARGE

## 2023-10-16 RX ORDER — PREDNISOLONE 5 MG
5 TABLET ORAL
Qty: 0 | Refills: 0 | DISCHARGE

## 2023-10-16 RX ORDER — VALSARTAN 80 MG/1
320 TABLET ORAL
Qty: 0 | Refills: 0 | DISCHARGE

## 2023-10-16 RX ORDER — PREDNISOLONE 5 MG
0 TABLET ORAL
Qty: 0 | Refills: 0 | DISCHARGE

## 2023-10-16 RX ORDER — FUROSEMIDE 40 MG
40 TABLET ORAL
Qty: 0 | Refills: 0 | DISCHARGE

## 2023-10-16 RX ORDER — FUROSEMIDE 40 MG
0 TABLET ORAL
Qty: 0 | Refills: 0 | DISCHARGE

## 2023-10-16 RX ORDER — AMLODIPINE BESYLATE 2.5 MG/1
5 TABLET ORAL
Qty: 0 | Refills: 0 | DISCHARGE

## 2023-10-16 RX ORDER — ATORVASTATIN CALCIUM 80 MG/1
0 TABLET, FILM COATED ORAL
Qty: 0 | Refills: 0 | DISCHARGE

## 2025-06-30 NOTE — PROGRESS NOTE ADULT - PROBLEM SELECTOR PLAN 2
Immediate Brief Procedure Note    Patient: Jose Kenny    Pre-op Diagnosis: * No pre-op diagnosis entered *    Post-op Diagnosis: Same    Procedure: Nephrostogram    Proceduralist: Dr Lavelle Steward     Anesthesia Type: None    Findings: No hydro. Patent ureteral stent. Safety PCN removed    Estimated Blood Loss: Minimal    Complications: Minimal    Specimens Removed: Safety PCN     Continue follow up with urology for ureteral stent exchanges   Stable BP. To continue current meds.